# Patient Record
Sex: MALE | Race: BLACK OR AFRICAN AMERICAN | Employment: FULL TIME | URBAN - METROPOLITAN AREA
[De-identification: names, ages, dates, MRNs, and addresses within clinical notes are randomized per-mention and may not be internally consistent; named-entity substitution may affect disease eponyms.]

---

## 2017-02-21 ENCOUNTER — OFFICE VISIT (OUTPATIENT)
Dept: INTERNAL MEDICINE CLINIC | Age: 52
End: 2017-02-21

## 2017-02-21 VITALS
OXYGEN SATURATION: 98 % | HEIGHT: 67 IN | RESPIRATION RATE: 17 BRPM | HEART RATE: 61 BPM | DIASTOLIC BLOOD PRESSURE: 84 MMHG | SYSTOLIC BLOOD PRESSURE: 128 MMHG | WEIGHT: 234.4 LBS | BODY MASS INDEX: 36.79 KG/M2 | TEMPERATURE: 97.9 F

## 2017-02-21 DIAGNOSIS — Z00.00 PREVENTATIVE HEALTH CARE: Primary | ICD-10-CM

## 2017-02-21 DIAGNOSIS — G89.29 CHRONIC PAIN OF BOTH KNEES: ICD-10-CM

## 2017-02-21 DIAGNOSIS — M25.561 CHRONIC PAIN OF BOTH KNEES: ICD-10-CM

## 2017-02-21 DIAGNOSIS — M25.562 CHRONIC PAIN OF BOTH KNEES: ICD-10-CM

## 2017-02-21 DIAGNOSIS — N52.9 ERECTILE DYSFUNCTION, UNSPECIFIED ERECTILE DYSFUNCTION TYPE: ICD-10-CM

## 2017-02-21 RX ORDER — TADALAFIL 20 MG/1
20 TABLET ORAL AS NEEDED
Qty: 10 TAB | Refills: 11 | Status: SHIPPED | OUTPATIENT
Start: 2017-02-21 | End: 2017-10-25

## 2017-02-21 NOTE — MR AVS SNAPSHOT
Visit Information Date & Time Provider Department Dept. Phone Encounter #  
 2/21/2017 11:00 AM Pierre Jimenezevgeny [facility]an[facility]  Sports Medicine and Primary Care [phone] [id number] Follow-up Instructions Return in about 1 year (around 2/21/2018). Follow-up and Disposition History Upcoming Health Maintenance Date Due Pneumococcal 19-64 Medium Risk (1 of 1 - PPSV23) 9/13/1986 DTaP/Tdap/Td series (1 - Tdap) 9/13/1988 INFLUENZA AGE 9 TO ADULT 8/1/2016 Allergies as of 2/21/2017  Review Complete On: 2/21/2017 By: Yanira Sales MD  
 No Known Allergies Current Immunizations  Never Reviewed No immunizations on file. Not reviewed this visit You Were Diagnosed With   
  
 Codes Comments Preventative health care    -  Primary ICD-10-CM: Z00.00 ICD-9-CM: V70.0 Erectile dysfunction, unspecified erectile dysfunction type     ICD-10-CM: N52.9 ICD-9-CM: 607.84 Chronic pain of both knees     ICD-10-CM: M25.561, M25.562, G89.29 ICD-9-CM: 719.46, 338.29 Vitals BP Pulse Temp Resp Height(growth percentile) Weight(growth percentile) 128/84 (BP 1 Location: Left arm, BP Patient Position: Sitting) 61 97.9 °F (36.6 °C) (Oral) 17 5' 7\" (1.702 m) 234 lb 6.4 oz (106.3 kg) SpO2 BMI Smoking Status 98% 36.71 kg/m2 Current Every Day Smoker Vitals History BMI and BSA Data Body Mass Index Body Surface Area  
 36.71 kg/m 2 2.24 m 2 Preferred Pharmacy Pharmacy Name Phone [facility]/PHARMACY #[id number]- [place], [place] - [address] [phone] Your Updated Medication List  
  
   
This list is accurate as of: 2/21/17  1:07 PM.  Always use your most recent med list.  
  
  
  
  
 Diphth, Pertus(Acell), Tetanus 2.5-8-5 Lf-mcg-Lf/0.5mL Syrg Commonly known as:  BOOSTRIX TDAP  
0.5 mL by IntraMUSCular route once for 1 dose. pneumococcal 13 subhash conj dip 0.5 mL Syrg injection Commonly known as:  PREVNAR-13  
0.5 mL by IntraMUSCular route once for 1 dose. tadalafil 20 mg tablet Commonly known as:  CIALIS Take 1 Tab by mouth as needed. Prescriptions Sent to Pharmacy Refills  
 pneumococcal 13 subhash conj dip (PREVNAR-13) 0.5 mL syrg injection 0 Si.5 mL by IntraMUSCular route once for 1 dose. Class: Normal  
 Pharmacy: [facility]/pharmacy [address] [place] [address] [address] Ph #: [phone] Route: IntraMUSCular Jordis Barks,, Tetanus (BOOSTRIX TDAP) 2.5-8-5 Lf-mcg-Lf/0.5mL syrg 0 Si.5 mL by IntraMUSCular route once for 1 dose. Class: Normal  
 Pharmacy: [facility]/pharmacy [address] [place] [address] [address] Ph #: [phone] Route: IntraMUSCular  
 tadalafil (CIALIS) 20 mg tablet 11 Sig: Take 1 Tab by mouth as needed. Class: Normal  
 Pharmacy: [facility]/pharmacy [address] [place] [address] [address] Ph #: [phone] Route: Oral  
  
We Performed the Following CBC WITH AUTOMATED DIFF [40754 CPT(R)] HEMOGLOBIN A1C WITH EAG [41994 CPT(R)] LIPID PANEL [51042 CPT(R)] METABOLIC PANEL, COMPREHENSIVE [26543 CPT(R)] IN COLLECTION VENOUS BLOOD,VENIPUNCTURE [id number] CPT(R)] PROSTATE SPECIFIC AG (PSA) [id number] CPT(R)] URINALYSIS W/ RFLX MICROSCOPIC [82892 CPT(R)] Follow-up Instructions Return in about 1 year (around 2018). Introducing [facility] & HE[facility]! Jairo Salinas introduces [employer] patient portal. Now you can access parts of your medical record, email your doctor's office, and request medication refills online. 1. In your internet browser, go to [email or web address] 2. Click on the First Time User? Click Here link in the Sign In box. You will see the New Member Sign Up page. 3. Enter your Kaeuferportal Access Code exactly as it appears below. You will not need to use this code after youve completed the sign-up process. If you do not sign up before the expiration date, you must request a new code. · Kaeuferportal Access Code: [email or web address] Expires: 5/22/2017  1:07 PM 
 
4. Enter the last four digits of your Social Security Number (xxxx) and Date of Birth (mm/dd/yyyy) as indicated and click Submit. You will be taken to the next sign-up page. 5. Create a Kaeuferportal ID. This will be your Kaeuferportal login ID and cannot be changed, so think of one that is secure and easy to remember. 6. Create a Kaeuferportal password. You can change your password at any time. 7. Enter your Password Reset Question and Answer. This can be used at a later time if you forget your password. 8. Enter your e-mail address. You will receive e-mail notification when new information is available in [address]. 9. Click Sign Up. You can now view and download portions of your medical record. 10. Click the Download Summary menu link to download a portable copy of your medical information. If you have questions, please visit the Frequently Asked Questions section of the Kaeuferportal website. Remember, Kaeuferportal is NOT to be used for urgent needs. For medical emergencies, dial 911. Now available from your iPhone and Android! Please provide this summary of care documentation to your next provider. Your primary care clinician is listed as Alfonzo Barry. If you have any questions after today's visit, please call [phone].

## 2017-02-21 NOTE — PROGRESS NOTES
SPORTS MEDICINE AND PRIMARY CARE  Rae Limon MD, Nicholas Marino[address] [address]  Phone:  [phone]  Fax: [phone]      Chief Complaint   Patient presents with    Physical     complete physical          SUBECTIVE:    Rich Pleitez is a 54 y.o. male Patient returns today alert and appropriate and ambulatory and has the capacity to give an accurate history. Patient comes in for a wellness exam.  He is also concerned about his knee, particularly on the left. He has had problems with ED and has used Cialis in the past and wonders if he can have a refill. Other new complaints are denied. Patient is seen for evaluation. Past Medical History   Diagnosis Date    Erectile dysfunction     Knee pain, bilateral     Obesity     Preventative health care      History reviewed. No pertinent past surgical history. No Known Allergies    REVIEW OF SYSTEMS:   No chest pain. No shortness of breath. Social History     Social History    Marital status:      Spouse name: N/A    Number of children: N/A    Years of education: N/A     Social History Main Topics    Smoking status: Current Every Day Smoker    Smokeless tobacco: None    Alcohol use Yes    Drug use: None    Sexual activity: Not Asked     Other Topics Concern    None     Social History Narrative    Family History: Mother:  Father:  Daughter(s): alive 15 yrs Son(s): alive 7,20 yrs 3 brother(s) , 5 sister(s) . 2 son(s) , 1    daughter(s) . Adopted patient. Social History: Alcohol Use Patient uses alcohol, Drinks per occasion: 2, Drinks per w [place]: 0. Smoking Status Patient is a current every day    smoker, Received cessation counseling on: 2013. Marital Status: . Lives w ith: spouse. Occupation/W ork: employed full time    w [employer]. Education/School: has highschool diploma, college. [employer]ism: [employer]. r  History reviewed.  No pertinent family history. OBJECTIVE:  Visit Vitals    /84 (BP 1 Location: Left arm, BP Patient Position: Sitting)    Pulse 61    Temp 97.9 °F (36.6 °C) (Oral)    Resp 17    Ht 5' 7\" (1.702 m)    Wt 234 lb 6.4 oz (106.3 kg)    SpO2 98%    BMI 36.71 kg/m2     ENT: perrla,  eom intact  NECK: supple. Thyroid normal  CHEST: clear to ascultation and percussion   HEART: regular rate and rhythm  ABD: soft, bowel sounds active  EXTREMITIES: no edema, pulse 1+     No visits with results within 3 Month(s) from this visit. Latest known visit with results is:    Office Visit on 10/07/2015   Component Date Value Ref Range Status    Specific Gravity 10/07/2015 1.024  1.005 - 1.030 Final    pH (UA) 10/07/2015 7.0  5.0 - 7.5 Final    Color 10/07/2015 Yellow  Yellow Final    Appearance 10/07/2015 Clear  Clear Final    Leukocyte Esterase 10/07/2015 1+* Negative Final    Protein 10/07/2015 Negative  Negative/Trace Final    Glucose 10/07/2015 Negative  Negative Final    Ketone 10/07/2015 Negative  Negative Final    Blood 10/07/2015 Negative  Negative Final    Bilirubin 10/07/2015 Negative  Negative Final    Urobilinogen 10/07/2015 2.0* 0.0 - 1.9 mg/dL Final    Nitrites 10/07/2015 Negative  Negative Final    Microscopic Examination 10/07/2015 See additional order   Final    Microscopic was indicated and was performed.     WBC 10/07/2015 4.2  3.4 - 10.8 x10E3/uL Final    RBC 10/07/2015 4.34  4.14 - 5.80 x10E6/uL Final    HGB 10/07/2015 13.3  12.6 - 17.7 g/dL Final    HCT 10/07/2015 41.7  37.5 - 51.0 % Final    MCV 10/07/2015 96  79 - 97 fL Final    MCH 10/07/2015 30.6  26.6 - 33.0 pg Final    MCHC 10/07/2015 31.9  31.5 - 35.7 g/dL Final    RDW 10/07/2015 12.3  12.3 - 15.4 % Final    PLATELET 04/24/5870 977  150 - 379 x10E3/uL Final    NEUTROPHILS 10/07/2015 50  % Final    Lymphocytes 10/07/2015 40  % Final    MONOCYTES 10/07/2015 7  % Final    EOSINOPHILS 10/07/2015 2  % Final    BASOPHILS 10/07/2015 1  % Final  ABS. NEUTROPHILS 10/07/2015 2.1  1.4 - 7.0 x10E3/uL Final    Abs Lymphocytes 10/07/2015 1.7  0.7 - 3.1 x10E3/uL Final    ABS. MONOCYTES 10/07/2015 0.3  0.1 - 0.9 x10E3/uL Final    ABS. EOSINOPHILS 10/07/2015 0.1  0.0 - 0.4 x10E3/uL Final    ABS. BASOPHILS 10/07/2015 0.0  0.0 - 0.2 x10E3/uL Final    IMMATURE GRANULOCYTES 10/07/2015 0  % Final    ABS. IMM. GRANS. 10/07/2015 0.0  0.0 - 0.1 x10E3/uL Final    Glucose 10/07/2015 73  65 - 99 mg/dL Final    BUN 10/07/2015 15  6 - 24 mg/dL Final    Creatinine 10/07/2015 1.03  0.76 - 1.27 mg/dL Final    GFR est non-AA 10/07/2015 85  >59 mL/min/1.73 Final    GFR est AA 10/07/2015 99  >59 mL/min/1.73 Final    BUN/Creatinine ratio 10/07/2015 15  9 - 20 Final    Sodium 10/07/2015 141  134 - 144 mmol/L Final    Potassium 10/07/2015 5.0  3.5 - 5.2 mmol/L Final    Chloride 10/07/2015 103  97 - 108 mmol/L Final    CO2 10/07/2015 25  18 - 29 mmol/L Final    Calcium 10/07/2015 9.2  8.7 - 10.2 mg/dL Final    Protein, total 10/07/2015 7.0  6.0 - 8.5 g/dL Final    Albumin 10/07/2015 4.2  3.5 - 5.5 g/dL Final    GLOBULIN, TOTAL 10/07/2015 2.8  1.5 - 4.5 g/dL Final    A-G Ratio 10/07/2015 1.5  1.1 - 2.5 Final    Bilirubin, total 10/07/2015 0.9  0.0 - 1.2 mg/dL Final    Alk. phosphatase 10/07/2015 75  39 - 117 IU/L Final    AST (SGOT) 10/07/2015 34  0 - 40 IU/L Final    ALT (SGPT) 10/07/2015 27  0 - 44 IU/L Final    Cholesterol, total 10/07/2015 156  100 - 199 mg/dL Final    Triglyceride 10/07/2015 67  0 - 149 mg/dL Final    HDL Cholesterol 10/07/2015 52  >39 mg/dL Final    Comment: According to ATP-III Guidelines, HDL-C >59 mg/dL is considered a  negative risk factor for CHD.  VLDL, calculated 10/07/2015 13  5 - 40 mg/dL Final    LDL, calculated 10/07/2015 91  0 - 99 mg/dL Final    Prostate Specific Ag 10/07/2015 0.2  0.0 - 4.0 ng/mL Final    Comment: Roche ECLIA methodology.   According to the American Urological Association, Serum PSA should  decrease and remain at undetectable levels after radical  prostatectomy. The AUA defines biochemical recurrence as an initial  PSA value 0.2 ng/mL or greater followed by a subsequent confirmatory  PSA value 0.2 ng/mL or greater. Values obtained with different assay methods or kits cannot be used  interchangeably. Results cannot be interpreted as absolute evidence  of the presence or absence of malignant disease.  Hemoglobin A1c 10/07/2015 4.9  4.8 - 5.6 % Final    Comment:          Increased risk for diabetes: 5.7 - 6.4           Diabetes: >6.4           Glycemic control for adults with diabetes: <7.0      WBC 10/07/2015 >30* 0 - 5 /hpf Final    RBC 10/07/2015 0-2  0 - 2 /hpf Final    Epithelial cells 10/07/2015 0-10  0 - 10 /hpf Final    Mucus 10/07/2015 Present  Not Estab. Final    Bacteria 10/07/2015 Few  None seen/Few Final          ASSESSMENT:  1. Preventative health care    2. Erectile dysfunction, unspecified erectile dysfunction type    3. Chronic pain of both knees      Patient's medical status is generally stable. Repeat blood pressure is 124/82 which is completely acceptable. His BMI is 36.71 indicating obesity. I think this is a contributing factor for his discomfort in his knees. I think if he got down towards his ideal body weight the knee discomfort would decrease if not resolve. To that end then we suggest thirty minutes of physical activity five days a week and a heart healthy weight reducing diet. Appropriate laboratory studies will be requested and will be sent to her in the mail. He will return to us in one year.           PLAN:  .  Orders Placed This Encounter    URINALYSIS W/ RFLX MICROSCOPIC    CBC WITH AUTOMATED DIFF    METABOLIC PANEL, COMPREHENSIVE    LIPID PANEL    PROSTATE SPECIFIC AG    HEMOGLOBIN A1C WITH EAG    pneumococcal 13 subhash conj dip (PREVNAR-13) 0.5 mL syrg injection    Diphth, Pertus,Acell,, Tetanus (BOOSTRIX TDAP) 2.5-8-5 Lf-mcg-Lf/0.5mL syrg    tadalafil (CIALIS) 20 mg tablet       Follow-up Disposition:  Return in about 1 year (around 2/21/2018). ATTENTION:   This medical record was transcribed using an electronic medical records system. Although proofread, it may and can contain electronic and spelling errors. Other human spelling and other errors may be present. Corrections may be executed at a later time. Please feel free to contact us for any clarifications as needed.

## 2017-02-21 NOTE — PROGRESS NOTES
1. Have you been to the ER, urgent care clinic since your last visit? Hospitalized since your last visit? No    2. Have you seen or consulted any other health care providers outside of the [address] [place] [address] since your last visit? Include any pap smears or colon screening.  No

## 2017-02-22 LAB
ALBUMIN SERPL-MCNC: 4.3 G/DL (ref 3.5–5.5)
ALBUMIN/GLOB SERPL: 1.4 {RATIO} (ref 1.1–2.5)
ALP SERPL-CCNC: 78 IU/L (ref 39–117)
ALT SERPL-CCNC: 18 IU/L (ref 0–44)
APPEARANCE UR: CLEAR
AST SERPL-CCNC: 26 IU/L (ref 0–40)
BACTERIA #/AREA URNS HPF: NORMAL /[HPF]
BASOPHILS # BLD AUTO: 0 X10E3/UL (ref 0–0.2)
BASOPHILS NFR BLD AUTO: 0 %
BILIRUB SERPL-MCNC: 1.7 MG/DL (ref 0–1.2)
BILIRUB UR QL STRIP: NEGATIVE
BUN SERPL-MCNC: 10 MG/DL (ref 6–24)
BUN/CREAT SERPL: 10 (ref 9–20)
CALCIUM SERPL-MCNC: 9 MG/DL (ref 8.7–10.2)
CASTS URNS QL MICRO: NORMAL /LPF
CHLORIDE SERPL-SCNC: 99 MMOL/L (ref 96–106)
CHOLEST SERPL-MCNC: 152 MG/DL (ref 100–199)
CO2 SERPL-SCNC: 28 MMOL/L (ref 18–29)
COLOR UR: YELLOW
CREAT SERPL-MCNC: 1 MG/DL (ref 0.76–1.27)
EOSINOPHIL # BLD AUTO: 0 X10E3/UL (ref 0–0.4)
EOSINOPHIL NFR BLD AUTO: 1 %
EPI CELLS #/AREA URNS HPF: NORMAL /HPF
ERYTHROCYTE [DISTWIDTH] IN BLOOD BY AUTOMATED COUNT: 12.4 % (ref 12.3–15.4)
EST. AVERAGE GLUCOSE BLD GHB EST-MCNC: 94 MG/DL
GLOBULIN SER CALC-MCNC: 3.1 G/DL (ref 1.5–4.5)
GLUCOSE SERPL-MCNC: 81 MG/DL (ref 65–99)
GLUCOSE UR QL: NEGATIVE
HBA1C MFR BLD: 4.9 % (ref 4.8–5.6)
HCT VFR BLD AUTO: 40.6 % (ref 37.5–51)
HDLC SERPL-MCNC: 55 MG/DL
HGB BLD-MCNC: 12.9 G/DL (ref 12.6–17.7)
HGB UR QL STRIP: NEGATIVE
IMM GRANULOCYTES # BLD: 0 X10E3/UL (ref 0–0.1)
IMM GRANULOCYTES NFR BLD: 0 %
KETONES UR QL STRIP: NEGATIVE
LDLC SERPL CALC-MCNC: 86 MG/DL (ref 0–99)
LEUKOCYTE ESTERASE UR QL STRIP: ABNORMAL
LYMPHOCYTES # BLD AUTO: 1.7 X10E3/UL (ref 0.7–3.1)
LYMPHOCYTES NFR BLD AUTO: 42 %
MCH RBC QN AUTO: 30.3 PG (ref 26.6–33)
MCHC RBC AUTO-ENTMCNC: 31.8 G/DL (ref 31.5–35.7)
MCV RBC AUTO: 95 FL (ref 79–97)
MICRO URNS: ABNORMAL
MONOCYTES # BLD AUTO: 0.3 X10E3/UL (ref 0.1–0.9)
MONOCYTES NFR BLD AUTO: 7 %
NEUTROPHILS # BLD AUTO: 2 X10E3/UL (ref 1.4–7)
NEUTROPHILS NFR BLD AUTO: 50 %
NITRITE UR QL STRIP: NEGATIVE
PH UR STRIP: 7.5 [PH] (ref 5–7.5)
PLATELET # BLD AUTO: 192 X10E3/UL (ref 150–379)
POTASSIUM SERPL-SCNC: 4.9 MMOL/L (ref 3.5–5.2)
PROT SERPL-MCNC: 7.4 G/DL (ref 6–8.5)
PROT UR QL STRIP: NEGATIVE
PSA SERPL-MCNC: 0.5 NG/ML (ref 0–4)
RBC # BLD AUTO: 4.26 X10E6/UL (ref 4.14–5.8)
RBC #/AREA URNS HPF: NORMAL /HPF
SODIUM SERPL-SCNC: 137 MMOL/L (ref 134–144)
SP GR UR: 1.01 (ref 1–1.03)
TRIGL SERPL-MCNC: 57 MG/DL (ref 0–149)
UROBILINOGEN UR STRIP-MCNC: 1 MG/DL (ref 0.2–1)
VLDLC SERPL CALC-MCNC: 11 MG/DL (ref 5–40)
WBC # BLD AUTO: 4 X10E3/UL (ref 3.4–10.8)
WBC #/AREA URNS HPF: NORMAL /HPF

## 2017-10-25 ENCOUNTER — OFFICE VISIT (OUTPATIENT)
Dept: INTERNAL MEDICINE CLINIC | Age: 52
End: 2017-10-25

## 2017-10-25 VITALS
SYSTOLIC BLOOD PRESSURE: 139 MMHG | TEMPERATURE: 98.3 F | WEIGHT: 247.9 LBS | BODY MASS INDEX: 38.91 KG/M2 | HEART RATE: 72 BPM | DIASTOLIC BLOOD PRESSURE: 87 MMHG | OXYGEN SATURATION: 90 % | RESPIRATION RATE: 16 BRPM | HEIGHT: 67 IN

## 2017-10-25 DIAGNOSIS — M25.562 CHRONIC PAIN OF BOTH KNEES: Primary | ICD-10-CM

## 2017-10-25 DIAGNOSIS — M25.561 CHRONIC PAIN OF BOTH KNEES: Primary | ICD-10-CM

## 2017-10-25 DIAGNOSIS — E66.9 OBESITY (BMI 35.0-39.9 WITHOUT COMORBIDITY): ICD-10-CM

## 2017-10-25 DIAGNOSIS — N52.9 ERECTILE DYSFUNCTION, UNSPECIFIED ERECTILE DYSFUNCTION TYPE: ICD-10-CM

## 2017-10-25 DIAGNOSIS — G89.29 CHRONIC PAIN OF BOTH KNEES: Primary | ICD-10-CM

## 2017-10-25 RX ORDER — SILDENAFIL 100 MG/1
100 TABLET, FILM COATED ORAL AS NEEDED
Qty: 10 TAB | Refills: 11 | Status: SHIPPED | OUTPATIENT
Start: 2017-10-25

## 2017-10-25 NOTE — MR AVS SNAPSHOT
Visit Information Date & Time Provider Department Dept. Phone Encounter #  
 10/25/2017 12:45 PM Rich Greer MD [address] [place] [address] and Primary Care [phone] [id number] Follow-up Instructions Return in about 6 months (around 4/25/2018). Follow-up and Disposition History Your Appointments 4/25/2018 12:00 PM  
Any with Rich Greer MD  
[facility] and Primary Care [address] [place] [address]) Appt Note: 6 month f/u  
 [address] [facility]  
  
   
 [id number] Upcoming Health Maintenance Date Due FOBT Q 1 YEAR AGE 50-75 4/25/2018* DTaP/Tdap/Td series (2 - Td) 10/25/2027 *Topic was postponed. The date shown is not the original due date. Allergies as of 10/25/2017  Review Complete On: 10/25/2017 By: Rich Greer MD  
 No Known Allergies Current Immunizations  Never Reviewed No immunizations on file. Not reviewed this visit You Were Diagnosed With   
  
 Codes Comments Chronic pain of both knees    -  Primary ICD-10-CM: M25.561, M25.562, G89.29 ICD-9-CM: 719.46, 338.29 Erectile dysfunction, unspecified erectile dysfunction type     ICD-10-CM: N52.9 ICD-9-CM: 607.84 Obesity (BMI 35.0-39.9 without comorbidity)     ICD-10-CM: C82.0 ICD-9-CM: 278.00 Vitals BP Pulse Temp Resp Height(growth percentile) Weight(growth percentile) 139/87 (BP 1 Location: Left arm, BP Patient Position: Sitting) 72 98.3 °F (36.8 °C) (Oral) 16 5' 7\" (1.702 m) 247 lb 14.4 oz (112.4 kg) SpO2 BMI Smoking Status 90% 38.83 kg/m2 Current Every Day Smoker BMI and BSA Data Body Mass Index Body Surface Area  
 38.83 kg/m 2 2.31 m 2 Preferred Pharmacy Pharmacy Name Phone [facility]/PHARMACY #[id number]- [place], [place] - [address] [phone] Your Updated Medication List  
 This list is accurate as of: 10/25/17  2:33 PM.  Always use your most recent med list.  
  
  
  
  
 sildenafil citrate 100 mg tablet Commonly known as:  VIAGRA Take 1 Tab by mouth as needed. Prescriptions Printed Refills  
 sildenafil citrate (VIAGRA) 100 mg tablet 11 Sig: Take 1 Tab by mouth as needed. Class: Print Route: Oral  
  
We Performed the Following REFERRAL TO ORTHOPEDICS [GYE240 Custom] Follow-up Instructions Return in about 6 months (around 4/25/2018). Referral Information Referral ID Referred By Referred To [address] [place] [address] [facility]   
   [address] [facility], [address] Visits Status Start Date End Date 1 New Request 10/25/17 10/25/18 If your referral has a status of pending review or denied, additional information will be sent to support the outcome of this decision. Introducing [facility]! [employer] introduces [email or web address] patient portal. Now you can access parts of your medical record, email your doctor's office, and request medication refills online. 1. In your internet browser, go to [email or web address] "[email or web address][employer] [email or web address] 2. Click on the First Time User? Click Here link in the Sign In box. You will see the New Member Sign Up page. 3. Enter your [email or web address] Access Code exactly as it appears below. You will not need to use this code after youve completed the sign-up process. If you do not sign up before the expiration date, you must request a new code. · [email or web address] Access Code: [email or web address] Expires: 1/23/2018  2:33 PM 
 
4. Enter the last four digits of your Social Security Number (xxxx) and Date of Birth (mm/dd/yyyy) as indicated and click Submit. You will be taken to the next sign-up page. 5. Create a [email or web address] ID. This will be your [email or web address] login ID and cannot be changed, so think of one that is secure and easy to remember. 6. Create a Omnireliant password. You can change your password at any time. 7. Enter your Password Reset Question and Answer. This can be used at a later time if you forget your password. 8. Enter your e-mail address. You will receive e-mail notification when new information is available in [address] 9. Click Sign Up. You can now view and download portions of your medical record. 10. Click the Download Summary menu link to download a portable copy of your medical information. If you have questions, please visit the Frequently Asked Questions section of the Omn[employer]liant website. Remember, Omn[employer]liant is NOT to be used for urgent needs. For medical emergencies, dial 911. Now available from your iPhone and Android! Please provide this summary of care documentation to your next provider. Your primary care clinician is listed as Viviana Yang. If you have any questions after today's visit, please call [phone].

## 2017-10-25 NOTE — PROGRESS NOTES
Chief Complaint   Patient presents with    Mass     bump on the left thumb x2 weeks    Knee Pain     knee pain is intermittent and has been going on since high school     1. Have you been to the ER, urgent care clinic since your last visit? Hospitalized since your last visit? No    2. Have you seen or consulted any other health care providers outside of the [address] [place] [address] since your last visit? Include any pap smears or colon screening.  No

## 2017-10-25 NOTE — PROGRESS NOTES
SPORTS MEDICINE AND PRIMARY CARE  Sapna Lara MD, [address], [place]  [address]  Phone:  [phone]  Fax: [phone]      Chief Complaint   Patient presents with    Mass     bump on the left thumb x2 weeks    Knee Pain     knee pain is intermittent and has been going on since high school         SUBECTIVE:    Kareen Salas is a 50 y.o. male Patient returns today alert, appropriate, ambulatory and has the capacity to give an accurate history. Patient has a known history of bilateral knee pain, obesity and ED. Patient complains of a little bump at the base on his thumb on the left at the palmar aspect. He grabbed his gun at work and noticed a little discomfort there and is seen for evaluation. Patient complains of discomfort in his knees, but particularly on the left, where he has limitation of motion of the knee. He wonders if it was an old injury years ago, but would like it checked out. Patient is seen for evaluation. Current Outpatient Prescriptions   Medication Sig Dispense Refill    sildenafil citrate (VIAGRA) 100 mg tablet Take 1 Tab by mouth as needed. 10 Tab 11     Past Medical History:   Diagnosis Date    Erectile dysfunction     Knee pain, bilateral     Obesity     Obesity (BMI 35.0-39.9 without comorbidity)     Preventative health care      History reviewed. No pertinent surgical history. No Known Allergies    REVIEW OF SYSTEMS:   No chest pain, no shortness of breath. Social History     Social History    Marital status:      Spouse name: N/A    Number of children: N/A    Years of education: N/A     Social History Main Topics    Smoking status: Current Every Day Smoker    Smokeless tobacco: Never Used    Alcohol use Yes    Drug use: No    Sexual activity: Yes     Partners: Female     Other Topics Concern    None     Social History Narrative    Family History:  Mother:  Father:  Daughter(s): alive 15 yrs Son(s): alive 7,20 yrs 3 brother(s) , 5 sister(s) . 2 son(s) , 1    daughter(s) . Adopted patient. Social History: Alcohol Use Patient uses alcohol, Drinks per occasion: 2, Drinks per w [place]: 0. Smoking Status Patient is a current every day    smoker, Received cessation counseling on: 02/12/2013. Marital Status: . Lives w ith: spouse. Occupation/W ork: employed full time    w [employer]. Education/School: has highschool diploma, college. [employer]ology: [email or web address]. r  History reviewed. No pertinent family history. OBJECTIVE:  Visit Vitals    /87 (BP 1 Location: Left arm, BP Patient Position: Sitting)    Pulse 72    Temp 98.3 °F (36.8 °C) (Oral)    Resp 16    Ht 5' 7\" (1.702 m)    Wt 247 lb 14.4 oz (112.4 kg)    SpO2 90%    BMI 38.83 kg/m2     ENT: perrla,  eom intact  NECK: supple. Thyroid normal  CHEST: clear to ascultation and percussion   HEART: regular rate and rhythm  ABD: soft, bowel sounds active  EXTREMITIES: no edema, pulse 1+     No visits with results within 3 Month(s) from this visit. Latest known visit with results is:    Office Visit on 02/21/2017   Component Date Value Ref Range Status    Specific Gravity 02/21/2017 1.014  1.005 - 1.030 Final    pH (UA) 02/21/2017 7.5  5.0 - 7.5 Final    Color 02/21/2017 Yellow  Yellow Final    Appearance 02/21/2017 Clear  Clear Final    Leukocyte Esterase 02/21/2017 Trace* Negative Final    Protein 02/21/2017 Negative  Negative/Trace Final    Glucose 02/21/2017 Negative  Negative Final    Ketone 02/21/2017 Negative  Negative Final    Blood 02/21/2017 Negative  Negative Final    Bilirubin 02/21/2017 Negative  Negative Final    Urobilinogen 02/21/2017 1.0  0.2 - 1.0 mg/dL Final    Nitrites 02/21/2017 Negative  Negative Final    Microscopic Examination 02/21/2017 See additional order   Final    Microscopic was indicated and was performed.     WBC 02/21/2017 4.0  3.4 - 10.8 x10E3/uL Final    RBC 02/21/2017 4.26  4.14 - 5.80 x10E6/uL Final    HGB 02/21/2017 12.9  12.6 - 17.7 g/dL Final    HCT 02/21/2017 40.6  37.5 - 51.0 % Final    MCV 02/21/2017 95  79 - 97 fL Final    MCH 02/21/2017 30.3  26.6 - 33.0 pg Final    MCHC 02/21/2017 31.8  31.5 - 35.7 g/dL Final    RDW 02/21/2017 12.4  12.3 - 15.4 % Final    PLATELET 68/35/9940 841  150 - 379 x10E3/uL Final    NEUTROPHILS 02/21/2017 50  % Final    Lymphocytes 02/21/2017 42  % Final    MONOCYTES 02/21/2017 7  % Final    EOSINOPHILS 02/21/2017 1  % Final    BASOPHILS 02/21/2017 0  % Final    ABS. NEUTROPHILS 02/21/2017 2.0  1.4 - 7.0 x10E3/uL Final    Abs Lymphocytes 02/21/2017 1.7  0.7 - 3.1 x10E3/uL Final    ABS. MONOCYTES 02/21/2017 0.3  0.1 - 0.9 x10E3/uL Final    ABS. EOSINOPHILS 02/21/2017 0.0  0.0 - 0.4 x10E3/uL Final    ABS. BASOPHILS 02/21/2017 0.0  0.0 - 0.2 x10E3/uL Final    IMMATURE GRANULOCYTES 02/21/2017 0  % Final    ABS. IMM. GRANS. 02/21/2017 0.0  0.0 - 0.1 x10E3/uL Final    Glucose 02/21/2017 81  65 - 99 mg/dL Final    BUN 02/21/2017 10  6 - 24 mg/dL Final    Creatinine 02/21/2017 1.00  0.76 - 1.27 mg/dL Final    GFR est non-AA 02/21/2017 88  >59 mL/min/1.73 Final    GFR est AA 02/21/2017 102  >59 mL/min/1.73 Final    BUN/Creatinine ratio 02/21/2017 10  9 - 20 Final    Sodium 02/21/2017 137  134 - 144 mmol/L Final    Potassium 02/21/2017 4.9  3.5 - 5.2 mmol/L Final    Chloride 02/21/2017 99  96 - 106 mmol/L Final    CO2 02/21/2017 28  18 - 29 mmol/L Final    Calcium 02/21/2017 9.0  8.7 - 10.2 mg/dL Final    Protein, total 02/21/2017 7.4  6.0 - 8.5 g/dL Final    Albumin 02/21/2017 4.3  3.5 - 5.5 g/dL Final    GLOBULIN, TOTAL 02/21/2017 3.1  1.5 - 4.5 g/dL Final    A-G Ratio 02/21/2017 1.4  1.1 - 2.5 Final    Comment: **Effective March 13, 2017 the reference interval**    for A/G Ratio will be changing to:                Age                Male          Female            0 -  7 days       1.1 - 2.3       1.1 - 2.3            8 - 30 days 1.2 - 2.8       1.2 - 2.8            1 -  6 months     1.3 - 3.6       1.3 - 3.6     7 months -  5 years      1.5 - 2.6       1.5 - 2.6               > 5 years      1.2 - 2.2       1.2 - 2.2      Bilirubin, total 02/21/2017 1.7* 0.0 - 1.2 mg/dL Final    Alk. phosphatase 02/21/2017 78  39 - 117 IU/L Final    AST (SGOT) 02/21/2017 26  0 - 40 IU/L Final    ALT (SGPT) 02/21/2017 18  0 - 44 IU/L Final    Cholesterol, total 02/21/2017 152  100 - 199 mg/dL Final    Triglyceride 02/21/2017 57  0 - 149 mg/dL Final    HDL Cholesterol 02/21/2017 55  >39 mg/dL Final    VLDL, calculated 02/21/2017 11  5 - 40 mg/dL Final    LDL, calculated 02/21/2017 86  0 - 99 mg/dL Final    Prostate Specific Ag 02/21/2017 0.5  0.0 - 4.0 ng/mL Final    Comment: Roche ECLIA methodology. According to the American Urological Association, Serum PSA should  decrease and remain at undetectable levels after radical  prostatectomy. The AUA defines biochemical recurrence as an initial  PSA value 0.2 ng/mL or greater followed by a subsequent confirmatory  PSA value 0.2 ng/mL or greater. Values obtained with different assay methods or kits cannot be used  interchangeably. Results cannot be interpreted as absolute evidence  of the presence or absence of malignant disease.  Hemoglobin A1c 02/21/2017 4.9  4.8 - 5.6 % Final    Comment:          Pre-diabetes: 5.7 - 6.4           Diabetes: >6.4           Glycemic control for adults with diabetes: <7.0      Estimated average glucose 02/21/2017 94  mg/dL Final    WBC 02/21/2017 0-5  0 - 5 /hpf Final    RBC 02/21/2017 0-2  0 - 2 /hpf Final    Epithelial cells 02/21/2017 0-10  0 - 10 /hpf Final    Casts 02/21/2017 None seen  None seen /lpf Final    Bacteria 02/21/2017 None seen  None seen/Few Final          ASSESSMENT:  1. Chronic pain of both knees    2. Erectile dysfunction, unspecified erectile dysfunction type    3.  Obesity (BMI 35.0-39.9 without comorbidity)      Thumb exam reveals a tiny nodule on the flexor surface of the flexor hallucis tendon. It is mildly tender. My vote is to observe, but we're going to ask him to see an orthopedic doctor about his knee, where he has limitation of the knee, as well as crepitation of the knee and joint laxity, to help us make a decision about both. He'll then return to our office as needed. His blood pressure is in the upper limits of normal, but acceptable. He remains with a BMI that reflects obesity and we would certainly encourage a heart healthy, weight reducing diet. PLAN:  .  Orders Placed This Encounter    [facility]    sildenafil citrate (VIAGRA) 100 mg tablet       Follow-up Disposition:  Return in about 6 months (around 4/25/2018). ATTENTION:   This medical record was transcribed using an electronic medical records system. Although proofread, it may and can contain electronic and spelling errors. Other human spelling and other errors may be present. Corrections may be executed at a later time. Please feel free to contact us for any clarifications as needed.

## 2021-12-16 ENCOUNTER — OFFICE VISIT (OUTPATIENT)
Dept: INTERNAL MEDICINE CLINIC | Age: 56
End: 2021-12-16
Payer: COMMERCIAL

## 2021-12-16 VITALS
SYSTOLIC BLOOD PRESSURE: 128 MMHG | TEMPERATURE: 98.6 F | DIASTOLIC BLOOD PRESSURE: 85 MMHG | RESPIRATION RATE: 16 BRPM | OXYGEN SATURATION: 93 % | HEART RATE: 81 BPM | WEIGHT: 245 LBS | HEIGHT: 67 IN | BODY MASS INDEX: 38.45 KG/M2

## 2021-12-16 DIAGNOSIS — Z11.59 NEED FOR HEPATITIS C SCREENING TEST: ICD-10-CM

## 2021-12-16 DIAGNOSIS — M17.12 ARTHRITIS OF LEFT KNEE: ICD-10-CM

## 2021-12-16 DIAGNOSIS — Z00.00 VISIT FOR WELL MAN HEALTH CHECK: Primary | ICD-10-CM

## 2021-12-16 DIAGNOSIS — S46.012A TRAUMATIC INCOMPLETE TEAR OF LEFT ROTATOR CUFF, INITIAL ENCOUNTER: ICD-10-CM

## 2021-12-16 PROCEDURE — 99396 PREV VISIT EST AGE 40-64: CPT | Performed by: FAMILY MEDICINE

## 2021-12-16 PROCEDURE — 99214 OFFICE O/P EST MOD 30 MIN: CPT | Performed by: FAMILY MEDICINE

## 2021-12-16 RX ORDER — NAPROXEN 500 MG/1
500 TABLET ORAL 2 TIMES DAILY WITH MEALS
Qty: 30 TABLET | Refills: 2 | Status: SHIPPED | OUTPATIENT
Start: 2021-12-16

## 2021-12-16 NOTE — PROGRESS NOTES
Chief Complaint   Patient presents with    Complete Physical     Patient is here for a wellness visit      he is a 49y.o. year old male who presents for CPE. Complete Physical Exam Questions:    1. Do you follow a low fat diet?  no  2. Are you up to date on your Tdap (<10 years)? Unknown  3. Have you ever had a Pneumovax vaccine (>65)? No   PCV13 No   PPSV23 No  4. Have you had Zoster vaccine (>60)? No  5. Have you had the HPV - Gardasil (13- 26)? No  6. Do you follow an exercise program?  no  7. Do you smoke?  yes If > 65 and smoker, have you had a abdominal aortic aneurysm ultrasound screen? No  8. Do you consider yourself overweight?  no  9. Is there a family history of CAD< age 48? No  10. Is there a family history of Cancer? No  11. Do you know your Cancer risks? No  12. Have you had a colonoscopy? Yes  13. Have you been tested for HIV or other STI's? No HIV today(18-64 y/o)? Yes   14. Have you had an EKG in the last five years(>50)? No  15. Have you had a PSA test done this year (50-69)? Unknown    Other complaints: Left shoulder pain    Reviewed and agree with Nurse Note and duplicated in this note. Reviewed PmHx, RxHx, FmHx, SocHx, AllgHx and updated and dated in the chart. History reviewed. No pertinent family history. Past Medical History:   Diagnosis Date    Erectile dysfunction     Knee pain, bilateral     Obesity     Obesity (BMI 35.0-39.9 without comorbidity)     Preventative health care       Social History     Socioeconomic History    Marital status:    Tobacco Use    Smoking status: Current Every Day Smoker    Smokeless tobacco: Never Used   Substance and Sexual Activity    Alcohol use: Yes    Drug use: No    Sexual activity: Yes     Partners: Female   Social History Narrative    Family History: Mother:  Father:  Daughter(s): alive 15 yrs Son(s): alive 7,20 yrs 3 brother(s) , 5 sister(s) . 2 son(s) , 1    daughter(s) . Adopted patient. Social History: Alcohol Use Patient uses alcohol, Drinks per occasion: 2, Drinks per w [place]: 0. Smoking Status Patient is a current every day    smoker, Received cessation counseling on: 02/12/2013. Marital Status: . Lives w ith: spouse. Occupation/W ork: employed full time    w [employer]. Education/School: has highschool diploma, college. [employer]ism: [email or web address]. Review of Systems - negative except as listed above    Objective:     Vitals:    12/16/21 1327   BP: 128/85   Pulse: 81   Resp: 16   Temp: 98.6 °F (37 °C)   SpO2: 93%   Weight: 245 lb (111.1 kg)   Height: 5' 7\" (1.702 m)       Physical Examination: General appearance - alert, well appearing, and in no distress  Eyes - pupils equal and reactive, extraocular eye movements intact  Ears - bilateral TM's and external ear canals normal  Nose - normal and patent, no erythema, discharge or polyps  Mouth - mucous membranes moist, pharynx normal without lesions  Neck - supple, no significant adenopathy  Chest - clear to auscultation, no wheezes, rales or rhonchi, symmetric air entry  Heart - normal rate, regular rhythm, normal S1, S2, no murmurs, rubs, clicks or gallops  Abdomen - soft, nontender, nondistended, no masses or organomegaly  Skin - normal coloration and turgor, no rashes, no suspicious skin lesions noted       Assessment/ Plan:   Diagnoses and all orders for this visit:    1. Visit for [facility] health check  -     CBC W/O DIFF; Future  -     LIPID PANEL; Future  -     METABOLIC PANEL, COMPREHENSIVE; Future  -     PSA W/ REFLX FREE PSA; Future    2. Traumatic incomplete tear of left rotator cuff, initial encounter  -     XR SHOULDER LT AP/LAT MIN 2 V; Future    3. Arthritis of left knee  -     XR KNEE LT MIN 4 V; Future    4. Need for hepatitis C screening test  -     HEPATITIS C AB;  Future           Labs to be drawn: CBC, CMP, Lipid            I have discussed the diagnosis with the patient and the intended plan as seen in the above orders. The patient has received an after-visit summary and questions were answered concerning future plans. Medication Side Effects and Warnings were discussed with patient,  Patient Labs were reviewed and or requested, and  Patient Past Records were reviewed and or requested  yes         . Chief Complaint   Patient presents with    Complete Physical     Patient is here for a wellness visit      he is a 49y.o. year old male who presents for evaluation of left shoulder pain x1 month. Patient states that he spent basketball and got hit on his left shoulder while he is reaching up for a ball. Patient denies any dislocation of the time but states that his pain was 8 out of 10. This is since calmed down but now he has intermittent pain whenever he reaches above head. Patient also states that he has chronic left knee pain that is also intermittent in nature. Usually occurs when he runs or cuts. He has been seen by orthopedics diagnosed with arthritis. Reviewed and agree with Nurse Note and duplicated in this note. Reviewed PmHx, RxHx, FmHx, SocHx, AllgHx and updated and dated in the chart. History reviewed. No pertinent family history. Past Medical History:   Diagnosis Date    Erectile dysfunction     Knee pain, bilateral     Obesity     Obesity (BMI 35.0-39.9 without comorbidity)     Preventative health care       Social History     Socioeconomic History    Marital status:    Tobacco Use    Smoking status: Current Every Day Smoker    Smokeless tobacco: Never Used   Substance and Sexual Activity    Alcohol use: Yes    Drug use: No    Sexual activity: Yes     Partners: Female   Social History Narrative    Family History: Mother:  Father:  Daughter(s): alive 15 yrs Son(s): alive 7,20 yrs 3 brother(s) , 5 sister(s) . 2 son(s) , 1    daughter(s) . Adopted patient.     Social History: Alcohol Use Patient uses alcohol, Drinks per occasion: 2, Drinks per w [place]: 0. Smoking Status Patient is a current every day    smoker, Received cessation counseling on: 02/12/2013. Marital Status: . Lives w ith: spouse. Occupation/W ork: employed full time    w [employer]. Education/School: has highschool diploma, college. [employer]ism: [employer]. Review of Systems - negative except as listed above      Objective:     Vitals:    12/16/21 1327   BP: 128/85   Pulse: 81   Resp: 16   Temp: 98.6 °F (37 °C)   SpO2: 93%   Weight: 245 lb (111.1 kg)   Height: 5' 7\" (1.702 m)       Physical Examination: General appearance - alert, well appearing, and in no distress  Back exam - full range of motion, no tenderness, palpable spasm or pain on motion  Neurological - alert, oriented, normal speech, no focal findings or movement disorder noted  Musculoskeletal - The left shoulder is  normal to inspection. The patient has diminished range with pain  . The shoulderis not tender to palpation . Bicep tendon: non-tender  The NEER test is negative. The John test:is positive   The Cross over test:is  negative. The Empty Can test:is  positive. Stressed ext rotation is:  positive. Stressed int rotation: negative. The Apprehension Sign is negative. The Lift off test is:  negative   Examination reveals the Painful Arch:  negative. The Labral Test is:  negative. The Sulcus Sign is:  negative. Extremities - peripheral pulses normal, no pedal edema, no clubbing or cyanosis  Skin - normal coloration and turgor, no rashes, no suspicious skin lesions noted   . Indications for study: Left shoulder pain    A limited  musculoskeletal ultrasound examination was performed on the left shoulder with the following findings: Biceps (LHB) tendon - long:  normal echogenic, linearly compact fibrillar appearance   Biceps (LHB) tendon - trans:  normal echogenic, tessellate compact fibrillar appearance   Subscapularis tendon - long: normal echogenic, linearly compact fibrillar birds-beak appearance   Subscapularis tendon - trans:  normal echogenic, tessellate compact fibrillar three-bundled appearance   Acromioclavicular joint - trans: normal joint-space with effusion   Subacromial (SA) impingement testing: Decreased maintenance of SA space and restricted supraspinatus glide  Supraspinatus tendon - long: normal echogenic, linearly compact fibrillar birds-beak appearance   Infraspinatus tendon - long: Hyper echogenic, linearly compact fibrillar birds-beak appearance   Teres minor tendon - long: normal echogenic, linearly compact fibrillar birds-beak appearance   Supraspinatus/Infraspinatus/Teres minor tendons - trans: Hypo-echogenic, tessellate compact fibrillar wagon-wheeled appearance     Impression: Partial tear of infraspinatus, subacromial impingement and AC joint arthritis    Scanned and Interpreted by Ashlee Kennedy MD [facility]          Assessment/ Plan:   Diagnoses and all orders for this visit:    1. Visit for [facility] health check  -     CBC W/O DIFF; Future  -     LIPID PANEL; Future  -     METABOLIC PANEL, COMPREHENSIVE; Future  -     PSA W/ REFLX FREE PSA; Future    2. Traumatic incomplete tear of left rotator cuff, initial encounter  -     XR SHOULDER LT AP/LAT MIN 2 V; Future    3. Arthritis of left knee  -     XR KNEE LT MIN 4 V; Future    4. Need for hepatitis C screening test  -     HEPATITIS C AB; Future                I have discussed the diagnosis with the patient and the intended plan as seen in the above orders. The patient has received an after-visit summary and questions were answered concerning future plans. Medication Side Effects and Warnings were discussed with patient,  Patient Labs were reviewed and or requested, and  Patient Past Records were reviewed and or requested  yes       Pt agrees to call or return to clinic and/or go to closest ER with any worsening of symptoms.   This may include, but not limited to increased fever (>100.4) with NSAIDS or Tylenol, increased edema, confusion, rash, worsening of presenting symptoms. Please note that this dictation was completed with [employer], the computer voice recognition software. Quite often unanticipated grammatical, syntax, homophones, and other interpretive errors are inadvertently transcribed by the computer software. Please disregard these errors. Please excuse any errors that have escaped final proofreading. Thank you.

## 2021-12-17 LAB
ALBUMIN SERPL-MCNC: 3.9 G/DL (ref 3.5–5)
ALBUMIN/GLOB SERPL: 1 {RATIO} (ref 1.1–2.2)
ALP SERPL-CCNC: 79 U/L (ref 45–117)
ALT SERPL-CCNC: 34 U/L (ref 12–78)
ANION GAP SERPL CALC-SCNC: 4 MMOL/L (ref 5–15)
AST SERPL-CCNC: 26 U/L (ref 15–37)
BILIRUB SERPL-MCNC: 1.4 MG/DL (ref 0.2–1)
BUN SERPL-MCNC: 13 MG/DL (ref 6–20)
BUN/CREAT SERPL: 13 (ref 12–20)
CALCIUM SERPL-MCNC: 9.1 MG/DL (ref 8.5–10.1)
CHLORIDE SERPL-SCNC: 107 MMOL/L (ref 97–108)
CHOLEST SERPL-MCNC: 162 MG/DL
CO2 SERPL-SCNC: 29 MMOL/L (ref 21–32)
COMMENT, HOLDF: NORMAL
CREAT SERPL-MCNC: 1.03 MG/DL (ref 0.7–1.3)
ERYTHROCYTE [DISTWIDTH] IN BLOOD BY AUTOMATED COUNT: 11.9 % (ref 11.5–14.5)
GLOBULIN SER CALC-MCNC: 3.9 G/DL (ref 2–4)
GLUCOSE SERPL-MCNC: 71 MG/DL (ref 65–100)
HCT VFR BLD AUTO: 42.4 % (ref 36.6–50.3)
HCV AB SERPL QL IA: NONREACTIVE
HDLC SERPL-MCNC: 58 MG/DL
HDLC SERPL: 2.8 {RATIO} (ref 0–5)
HGB BLD-MCNC: 13.4 G/DL (ref 12.1–17)
LDLC SERPL CALC-MCNC: 86 MG/DL (ref 0–100)
MCH RBC QN AUTO: 31.9 PG (ref 26–34)
MCHC RBC AUTO-ENTMCNC: 31.6 G/DL (ref 30–36.5)
MCV RBC AUTO: 101 FL (ref 80–99)
NRBC # BLD: 0 K/UL (ref 0–0.01)
NRBC BLD-RTO: 0 PER 100 WBC
PLATELET # BLD AUTO: 186 K/UL (ref 150–400)
PMV BLD AUTO: 10.8 FL (ref 8.9–12.9)
POTASSIUM SERPL-SCNC: 4.1 MMOL/L (ref 3.5–5.1)
PROT SERPL-MCNC: 7.8 G/DL (ref 6.4–8.2)
RBC # BLD AUTO: 4.2 M/UL (ref 4.1–5.7)
SAMPLES BEING HELD,HOLD: NORMAL
SODIUM SERPL-SCNC: 140 MMOL/L (ref 136–145)
TRIGL SERPL-MCNC: 90 MG/DL (ref ?–150)
VLDLC SERPL CALC-MCNC: 18 MG/DL
WBC # BLD AUTO: 4.7 K/UL (ref 4.1–11.1)

## 2021-12-18 LAB
PSA SERPL-MCNC: 0.5 NG/ML (ref 0–4)
REFLEX CRITERIA: NORMAL

## 2022-01-06 ENCOUNTER — HOSPITAL ENCOUNTER (OUTPATIENT)
Dept: PHYSICAL THERAPY | Age: 57
Discharge: HOME | End: 2022-01-06
Payer: COMMERCIAL

## 2022-01-06 DIAGNOSIS — S46.012A TRAUMATIC INCOMPLETE TEAR OF LEFT ROTATOR CUFF, INITIAL ENCOUNTER: ICD-10-CM

## 2022-01-06 PROCEDURE — 97161 PT EVAL LOW COMPLEX 20 MIN: CPT | Performed by: PHYSICAL THERAPIST

## 2022-01-06 PROCEDURE — 97110 THERAPEUTIC EXERCISES: CPT | Performed by: PHYSICAL THERAPIST

## 2022-01-06 NOTE — PROGRESS NOTES
PT INITIAL EVALUATION NOTE - [facility] 2-15    Patient Name: Stefany Thompson  Date:2022  : 1965  [x]  Patient  Verified  Payor: BLUE CROSS / Plan: [facility] PPO / Product Type: PPO /    In time: 835a  Out time: 920a  Total Treatment Time (min): 45  Total Timed Codes (min): 10  1:1 Treatment Time ( only): 10   Visit #: 1     Treatment Area: Traumatic incomplete tear of left rotator cuff, initial encounter [S46.782A]    SUBJECTIVE  Pain Level (0-10 scale): 6  Any medication changes, allergies to medications, adverse drug reactions, diagnosis change, or new procedure performed?: [] No    [x] Yes (see summary sheet for update)  Subjective:    Patient reports with acute onset of L shoulder pain and chronic L knee pain. He says that he was reaching overhead playing basketball and his shoulder got hit from behind. No known clicking, cracking, popping noted at the time, but occasionally he gets that now. Overall is better since onset, but still painful. Works for [employer] at [employer], and hasn't noticed any significant limitations at work. 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 Biggest limitations at this time is full motion of the shoulder and activity tolerance for playing basketball    Description of symptoms: anterior shoulder pain in the area of subacromial space  Aggravating Factors: reaching behind back  Alleviating Factors: IcyHot    Radiation: occasional radiation down biceps     OBJECTIVE/EXAMINATION  Posture:  Mild forward shoulder position on L, scapular protraction  Other Observations: Repeated scaption: early scapular return on L  Palpation: TTP over subacromial space/bicipital groove       (active/passive)  (active/passive)  Shoulder ROM:   R    L   Flexion   160deg   160deg   Abduction  160deg   150deg   IR   T12    L2 p!    ER   T4    T4    Joint Mobility Assessment: Glenohumeral: reduced inferior and posterior mobility L Flexibility: posterior capsular stretch: (+) for tightness    UPPER QUARTER   MUSCLE STRENGTH  KEY       R  L  0 - No Contraction   Flexion  --  --  1 - Trace    Extension --  --  2 - Poor    Abduction --  --  3 - Fair     IR  5  5  4 - Good    ER  5  4  5 - Normal    Mid-Trap --  --    Neurological: Reflexes / Sensations: nt  Special Tests: Luisa: (+)       Empty Can: (+)       10 min Therapeutic Exercise:  [x] See flow sheet :   Rationale: increase ROM and increase strength to improve the patients ability to use arm without pain          With   [] TE   [] TA   [] Neuro   [] SC   [] other: Patient Education: [x] Review HEP    [] Progressed/Changed HEP based on:   [] positioning   [] body mechanics   [] transfers   [] heat/ice application    [] other:      Other Objective/Functional Measures: FOTO Functional Measure: 24/100    Pain Level (0-10 scale) post treatment: 5    ASSESSMENT/Changes in Function:     [x]  See Plan of SALO Bernstein DPT 1/6/2022

## 2022-01-06 NOTE — PROGRESS NOTES
Physical Therapy at [facility],   a part of [facility], [address] [place] [address], [address]  Phone: [phone]  Fax: [phone]    Plan of Care/Statement of Necessity for Physical Therapy Services  2-15    Patient name: Stefano Cerda  : 1965  Provider#: [id number]  Referral source: Jorge Kebede MD      Medical/Treatment Diagnosis: Traumatic incomplete tear of left rotator cuff, initial encounter Berta Stinson     Prior Hospitalization: see medical history     Comorbidities: obesity  Prior Level of Function: able to play basketball without shoulder pain  Medications: Verified on Patient Summary List    Start of Care: 22      Onset Date: 2021       The Plan of Care and following information is based on the information from the initial evaluation. Assessment/ key information: Patient reports with acute onset of L shoulder pain from a basketball-related injury. He has some reduced ROM, strength, and function in L shoulder. Symptom presentation consistent with rotator cuff tendinopathy of infraspinatus, as well symptoms of subacromial impingement. He also reports with chronic knee pain. Has been diagnosed with tricompartmental arthritic changes. Did not assess this today, but plan to assess at a later date as shoulder symptoms improve.      Evaluation Complexity History MEDIUM  Complexity : 1-2 comorbidities / personal factors will impact the outcome/ POC ; Examination HIGH Complexity : 4+ Standardized tests and measures addressing body structure, function, activity limitation and / or participation in recreation  ;Presentation LOW Complexity : Stable, uncomplicated  ;Clinical Decision Making HIGH Complexity : FOTO score of 1- 25   Overall Complexity Rating: LOW     Problem List: pain affecting function, decrease ROM, decrease strength, decrease ADL/ functional abilitiies, decrease activity tolerance and decrease flexibility/ joint mobility   Treatment Plan may include any combination of the following: Therapeutic exercise, Therapeutic activities, Neuromuscular re-education, Physical agent/modality, Manual therapy, Patient education and Self Care training  Patient / Family readiness to learn indicated by: asking questions and interest  Persons(s) to be included in education: patient (P)  Barriers to Learning/Limitations: None  Patient Goal (s): better shoulder function  Patient Self Reported Health Status: good  Rehabilitation Potential: good    Long Term Goals: To be accomplished in 10-12 treatments:   1. Patient will be able to reach behind back without increase in symptoms   2. Pt will be able to self-manage care using updated HEP for improved independence   3. Improved FOTO score to 63 or better to demonstrate improved function   4. Pt will be able to return to playing basketball without significant shoulder discomfort  Frequency / Duration: Patient to be seen 1-2 times per week for 10-12 treatments. Patient/ Caregiver education and instruction: self care and exercises    [x]  Plan of care has been reviewed with ROSSY Simon PT, DPT 1/6/2022     ________________________________________________________________________    I certify that the above Therapy Services are being furnished while the patient is under my care. I agree with the treatment plan and certify that this therapy is necessary.     [de-identified] Signature:____________________  Date:____________Time: _________      Chago Parish MD

## 2022-01-12 ENCOUNTER — APPOINTMENT (OUTPATIENT)
Dept: PHYSICAL THERAPY | Age: 57
End: 2022-01-12
Payer: COMMERCIAL

## 2022-01-14 ENCOUNTER — APPOINTMENT (OUTPATIENT)
Dept: PHYSICAL THERAPY | Age: 57
End: 2022-01-14
Payer: COMMERCIAL

## 2022-01-18 ENCOUNTER — APPOINTMENT (OUTPATIENT)
Dept: PHYSICAL THERAPY | Age: 57
End: 2022-01-18
Payer: COMMERCIAL

## 2022-01-20 ENCOUNTER — APPOINTMENT (OUTPATIENT)
Dept: PHYSICAL THERAPY | Age: 57
End: 2022-01-20
Payer: COMMERCIAL

## 2022-01-25 ENCOUNTER — APPOINTMENT (OUTPATIENT)
Dept: PHYSICAL THERAPY | Age: 57
End: 2022-01-25
Payer: COMMERCIAL

## 2022-01-27 ENCOUNTER — APPOINTMENT (OUTPATIENT)
Dept: PHYSICAL THERAPY | Age: 57
End: 2022-01-27
Payer: COMMERCIAL

## 2022-02-15 NOTE — PROGRESS NOTES
Physical Therapy at [facility],   a part of [address] [place] [address]  [address] [address], [address] [place] [address], [address] [place] [address]  Phone: [phone]  Fax: [phone]    Discharge Summary  2-15    Patient name: Pawel Estrada  : 1965  Provider#: [id number]  Referral source: Jamshid Wang MD      Medical/Treatment Diagnosis: Traumatic incomplete tear of left rotator cuff, initial encounter Lulu Marking     Prior Hospitalization: see medical history     Comorbidities: obesity  Prior Level of Function: able to play basketball without shoulder pain  Medications: Verified on Patient Summary List     Start of Care: 22                                                                       Onset Date: 2021           Visits from Start of Care: 1     Missed Visits: --  Reporting Period : 22 to 22      ASSESSMENT/SUMMARY OF CARE: Mr. Raimundo Rodríguez was seen for 1 session regarding his L shoulder pain. Failed to return after his 22 session, citing COVID exposure, and will be considered discharged at this time. Instructed in HEP during course of care.      RECOMMENDATIONS:  [x]Discontinue therapy: []Patient has reached or is progressing toward set goals      [x]Patient is non-compliant or has abdicated      []Due to lack of appreciable progress towards set [address] Macarena Lauren PT, DPT 2/15/2022

## 2023-05-19 RX ORDER — SILDENAFIL 100 MG/1
100 TABLET, FILM COATED ORAL PRN
COMMUNITY
Start: 2017-10-25

## 2023-05-19 RX ORDER — NAPROXEN 500 MG/1
500 TABLET ORAL 2 TIMES DAILY WITH MEALS
COMMUNITY
Start: 2021-12-16

## 2024-04-16 ENCOUNTER — OFFICE VISIT (OUTPATIENT)
Facility: CLINIC | Age: 59
End: 2024-04-16
Payer: COMMERCIAL

## 2024-04-16 VITALS
SYSTOLIC BLOOD PRESSURE: 162 MMHG | RESPIRATION RATE: 16 BRPM | WEIGHT: 242 LBS | OXYGEN SATURATION: 98 % | BODY MASS INDEX: 37.98 KG/M2 | DIASTOLIC BLOOD PRESSURE: 104 MMHG | TEMPERATURE: 98.3 F | HEART RATE: 78 BPM | HEIGHT: 67 IN

## 2024-04-16 DIAGNOSIS — N50.89 ENLARGED TESTICLE: ICD-10-CM

## 2024-04-16 DIAGNOSIS — M17.0 PRIMARY OSTEOARTHRITIS OF BOTH KNEES: ICD-10-CM

## 2024-04-16 DIAGNOSIS — Z00.00 VISIT FOR WELL MAN HEALTH CHECK: Primary | ICD-10-CM

## 2024-04-16 DIAGNOSIS — I10 PRIMARY HYPERTENSION: ICD-10-CM

## 2024-04-16 DIAGNOSIS — Z12.11 SCREENING FOR COLORECTAL CANCER: ICD-10-CM

## 2024-04-16 DIAGNOSIS — Z12.12 SCREENING FOR COLORECTAL CANCER: ICD-10-CM

## 2024-04-16 LAB
ALBUMIN SERPL-MCNC: 3.7 G/DL (ref 3.5–5)
ALBUMIN/GLOB SERPL: 1 (ref 1.1–2.2)
ALP SERPL-CCNC: 75 U/L (ref 45–117)
ALT SERPL-CCNC: 31 U/L (ref 12–78)
ANION GAP SERPL CALC-SCNC: 1 MMOL/L (ref 5–15)
AST SERPL-CCNC: 24 U/L (ref 15–37)
BASOPHILS # BLD: 0 K/UL (ref 0–0.1)
BASOPHILS NFR BLD: 0 % (ref 0–1)
BILIRUB SERPL-MCNC: 1.1 MG/DL (ref 0.2–1)
BUN SERPL-MCNC: 12 MG/DL (ref 6–20)
BUN/CREAT SERPL: 11 (ref 12–20)
CALCIUM SERPL-MCNC: 9.3 MG/DL (ref 8.5–10.1)
CHLORIDE SERPL-SCNC: 109 MMOL/L (ref 97–108)
CHOLEST SERPL-MCNC: 151 MG/DL
CO2 SERPL-SCNC: 31 MMOL/L (ref 21–32)
CREAT SERPL-MCNC: 1.1 MG/DL (ref 0.7–1.3)
DIFFERENTIAL METHOD BLD: ABNORMAL
EOSINOPHIL # BLD: 0.1 K/UL (ref 0–0.4)
EOSINOPHIL NFR BLD: 1 % (ref 0–7)
ERYTHROCYTE [DISTWIDTH] IN BLOOD BY AUTOMATED COUNT: 11.2 % (ref 11.5–14.5)
GLOBULIN SER CALC-MCNC: 3.8 G/DL (ref 2–4)
GLUCOSE SERPL-MCNC: 111 MG/DL (ref 65–100)
HCT VFR BLD AUTO: 38.3 % (ref 36.6–50.3)
HDLC SERPL-MCNC: 51 MG/DL
HDLC SERPL: 3 (ref 0–5)
HGB BLD-MCNC: 12.4 G/DL (ref 12.1–17)
HIV 1+2 AB+HIV1 P24 AG SERPL QL IA: NONREACTIVE
HIV 1/2 RESULT COMMENT: NORMAL
IMM GRANULOCYTES # BLD AUTO: 0 K/UL (ref 0–0.04)
IMM GRANULOCYTES NFR BLD AUTO: 0 % (ref 0–0.5)
LDLC SERPL CALC-MCNC: 85.4 MG/DL (ref 0–100)
LYMPHOCYTES # BLD: 1.4 K/UL (ref 0.8–3.5)
LYMPHOCYTES NFR BLD: 28 % (ref 12–49)
MCH RBC QN AUTO: 31.3 PG (ref 26–34)
MCHC RBC AUTO-ENTMCNC: 32.4 G/DL (ref 30–36.5)
MCV RBC AUTO: 96.7 FL (ref 80–99)
MONOCYTES # BLD: 0.5 K/UL (ref 0–1)
MONOCYTES NFR BLD: 9 % (ref 5–13)
NEUTS SEG # BLD: 3 K/UL (ref 1.8–8)
NEUTS SEG NFR BLD: 62 % (ref 32–75)
NRBC # BLD: 0 K/UL (ref 0–0.01)
NRBC BLD-RTO: 0 PER 100 WBC
PLATELET # BLD AUTO: 246 K/UL (ref 150–400)
PMV BLD AUTO: 10.9 FL (ref 8.9–12.9)
POTASSIUM SERPL-SCNC: 4.2 MMOL/L (ref 3.5–5.1)
PROT SERPL-MCNC: 7.5 G/DL (ref 6.4–8.2)
PSA SERPL-MCNC: 1.1 NG/ML (ref 0.01–4)
RBC # BLD AUTO: 3.96 M/UL (ref 4.1–5.7)
SODIUM SERPL-SCNC: 141 MMOL/L (ref 136–145)
TRIGL SERPL-MCNC: 73 MG/DL
VLDLC SERPL CALC-MCNC: 14.6 MG/DL
WBC # BLD AUTO: 5 K/UL (ref 4.1–11.1)

## 2024-04-16 PROCEDURE — 99214 OFFICE O/P EST MOD 30 MIN: CPT | Performed by: FAMILY MEDICINE

## 2024-04-16 PROCEDURE — 3080F DIAST BP >= 90 MM HG: CPT | Performed by: FAMILY MEDICINE

## 2024-04-16 PROCEDURE — 99396 PREV VISIT EST AGE 40-64: CPT | Performed by: FAMILY MEDICINE

## 2024-04-16 PROCEDURE — 3077F SYST BP >= 140 MM HG: CPT | Performed by: FAMILY MEDICINE

## 2024-04-16 RX ORDER — HYDROCHLOROTHIAZIDE 25 MG/1
25 TABLET ORAL EVERY MORNING
Qty: 90 TABLET | Refills: 1 | Status: SHIPPED | OUTPATIENT
Start: 2024-04-16

## 2024-04-16 SDOH — ECONOMIC STABILITY: HOUSING INSECURITY
IN THE LAST 12 MONTHS, WAS THERE A TIME WHEN YOU DID NOT HAVE A STEADY PLACE TO SLEEP OR SLEPT IN A SHELTER (INCLUDING NOW)?: NO

## 2024-04-16 SDOH — ECONOMIC STABILITY: INCOME INSECURITY: HOW HARD IS IT FOR YOU TO PAY FOR THE VERY BASICS LIKE FOOD, HOUSING, MEDICAL CARE, AND HEATING?: NOT HARD AT ALL

## 2024-04-16 SDOH — ECONOMIC STABILITY: FOOD INSECURITY: WITHIN THE PAST 12 MONTHS, YOU WORRIED THAT YOUR FOOD WOULD RUN OUT BEFORE YOU GOT MONEY TO BUY MORE.: NEVER TRUE

## 2024-04-16 SDOH — ECONOMIC STABILITY: FOOD INSECURITY: WITHIN THE PAST 12 MONTHS, THE FOOD YOU BOUGHT JUST DIDN'T LAST AND YOU DIDN'T HAVE MONEY TO GET MORE.: NEVER TRUE

## 2024-04-16 ASSESSMENT — PATIENT HEALTH QUESTIONNAIRE - PHQ9
SUM OF ALL RESPONSES TO PHQ QUESTIONS 1-9: 0
2. FEELING DOWN, DEPRESSED OR HOPELESS: NOT AT ALL
SUM OF ALL RESPONSES TO PHQ QUESTIONS 1-9: 0
1. LITTLE INTEREST OR PLEASURE IN DOING THINGS: NOT AT ALL
SUM OF ALL RESPONSES TO PHQ9 QUESTIONS 1 & 2: 0

## 2024-04-16 NOTE — PROGRESS NOTES
Chief Complaint   Patient presents with    Annual Exam     Patient is here for a wellness visit.     Knee Pain     Patient is here for pain in both knee.      he is a 58 y.o. year old male who presents for CPE.  Complete Physical Exam Questions:    1.  Do you follow a low fat diet?  no  2.  Are you up to date on your Tdap (<10 years)?  No  3.  Have you ever had a Pneumovax vaccine (>65)?  No   PCV13 No   PPSV23 No  4.  Have you had Zoster vaccine (>60)? No  5.  Have you had the HPV - Gardasil (13- 26)? Not applicable  6.  Do you follow an exercise program?  No  7.  Do you smoke?  Yes If > 65 and smoker, have you had a abdominal aortic aneurysm ultrasound screen?  No  8.  Do you consider yourself overweight?  No  9.  Is there a family history of CAD< age 50?  No  10.  Is there a family history of Cancer?  No  11.  Do you know your Cancer risks? No  12.  Have you had a colonoscopy?  Yes  13. Have you been tested for HIV or other STI's? No HIV today(18-64 y/o)?No   14.  Have you had an EKG in the last five years(>50)?Unknown  15.  Have you had a PSA test done this year (50-69)? No    Other complaints:  Patient states he has pain in both knees and would like a FMLA form completed     Reviewed and agree with Nurse Note and duplicated in this note.  Reviewed PmHx, RxHx, FmHx, SocHx, AllgHx and updated and dated in the chart.    History reviewed. No pertinent family history.    Past Medical History:   Diagnosis Date    Erectile dysfunction     Knee pain, bilateral     Obesity     Obesity (BMI 35.0-39.9 without comorbidity)     Preventative health care       Social History     Socioeconomic History    Marital status:      Spouse name: None    Number of children: None    Years of education: None    Highest education level: None   Tobacco Use    Smoking status: Every Day    Smokeless tobacco: Never   Substance and Sexual Activity    Alcohol use: Yes    Drug use: No   Social History Narrative     college. [employer]: [employer]

## 2024-04-17 DIAGNOSIS — R73.9 ELEVATED BLOOD SUGAR: Primary | ICD-10-CM

## 2024-04-24 ENCOUNTER — HOSPITAL ENCOUNTER (OUTPATIENT)
Facility: HOSPITAL | Age: 59
Discharge: HOME | End: 2024-04-27
Attending: FAMILY MEDICINE
Payer: COMMERCIAL

## 2024-04-24 DIAGNOSIS — N50.89 ENLARGED TESTICLE: ICD-10-CM

## 2024-04-24 PROCEDURE — 76870 US EXAM SCROTUM: CPT

## 2024-04-26 ENCOUNTER — HOSPITAL ENCOUNTER (OUTPATIENT)
Dept: PHYSICAL THERAPY | Facility: HOSPITAL | Age: 59
Setting detail: RECURRING SERIES
Discharge: HOME | End: 2024-04-29
Payer: COMMERCIAL

## 2024-04-26 PROCEDURE — 97161 PT EVAL LOW COMPLEX 20 MIN: CPT | Performed by: PHYSICAL THERAPIST

## 2024-04-26 NOTE — THERAPY EVALUATION
Physical Therapy at [facility],   a part of [address], [address]  [place] [address]  Phone: [phone]  Fax: [phone]           PHYSICAL THERAPY - MEDICARE EVALUATION/PLAN OF CARE NOTE (updated 3/23)      Date: 2024          Patient Name:  Jaquan Cartwright :  1965   Medical   Diagnosis:  Primary osteoarthritis of both knees [M17.0] Treatment Diagnosis:  M25.561  RIGHT KNEE PAIN and M25.562  LEFT KNEE PAIN    Referral Source:  Jv Franklin MD Provider #:  [id number]                Insurance: Payor: BCBS / Plan: BCBS OUT OF STATE / Product Type: *No Product type* /      Patient  verified yes     Visit #   Current  / Total 1 12   Time   In / Out 1020a 1100a   Total Treatment Time 40   Total Timed Codes --   1:1 Treatment Time --      [facility] Totals Reminder:  bill using total billable   min of TIMED therapeutic procedures and modalities.   8-22 min = 1 unit; 23-37 min = 2 units; 38-52 min = 3 units;  53-67 min = 4 units; 68-82 min = 5 units           SUBJECTIVE  Pain Level (0-10 scale): 9/10  []constant []intermittent []improving []worsening []no change since onset    Any medication changes, allergies to medications, adverse drug reactions, diagnosis change, or new procedure performed?: [x] No    [] Yes (see summary sheet for update)  Medications: Verified on Patient Summary List    Subjective functional status/changes:     Progressive increase of bilateral Bilateral knee pain (L> R) over the past 1-2 years.  Worse with sitting > 5 minutes, stairs, standing/walking at work > 4 hours, squatting, twisting movements while standing.   Knees are stiff in AM then improves a she moves around in the day then is worse by evening if he does too much.    Radiograph FINDINGS:  No acute fractures or dislocations. Chronic ossific density adjacent to the  lateral tibial spine on the right. Moderate to severe left and mild to moderate  right

## 2024-04-29 DIAGNOSIS — N43.3 HYDROCELE, UNSPECIFIED HYDROCELE TYPE: Primary | ICD-10-CM

## 2024-04-30 ENCOUNTER — OFFICE VISIT (OUTPATIENT)
Facility: CLINIC | Age: 59
End: 2024-04-30
Payer: COMMERCIAL

## 2024-04-30 ENCOUNTER — HOSPITAL ENCOUNTER (OUTPATIENT)
Dept: PHYSICAL THERAPY | Facility: HOSPITAL | Age: 59
Setting detail: RECURRING SERIES
Discharge: HOME | End: 2024-05-03
Payer: COMMERCIAL

## 2024-04-30 VITALS
OXYGEN SATURATION: 95 % | DIASTOLIC BLOOD PRESSURE: 90 MMHG | HEIGHT: 67 IN | TEMPERATURE: 98.4 F | SYSTOLIC BLOOD PRESSURE: 141 MMHG | BODY MASS INDEX: 37.67 KG/M2 | HEART RATE: 82 BPM | WEIGHT: 240 LBS | RESPIRATION RATE: 16 BRPM

## 2024-04-30 DIAGNOSIS — I10 PRIMARY HYPERTENSION: Primary | ICD-10-CM

## 2024-04-30 DIAGNOSIS — I86.1 VARICOCELE: ICD-10-CM

## 2024-04-30 DIAGNOSIS — N52.9 ERECTILE DYSFUNCTION, UNSPECIFIED ERECTILE DYSFUNCTION TYPE: ICD-10-CM

## 2024-04-30 PROCEDURE — 99214 OFFICE O/P EST MOD 30 MIN: CPT | Performed by: FAMILY MEDICINE

## 2024-04-30 PROCEDURE — 3077F SYST BP >= 140 MM HG: CPT | Performed by: FAMILY MEDICINE

## 2024-04-30 PROCEDURE — 97110 THERAPEUTIC EXERCISES: CPT | Performed by: PHYSICAL THERAPIST

## 2024-04-30 PROCEDURE — 3080F DIAST BP >= 90 MM HG: CPT | Performed by: FAMILY MEDICINE

## 2024-04-30 RX ORDER — AMLODIPINE BESYLATE 5 MG/1
5 TABLET ORAL DAILY
Qty: 90 TABLET | Refills: 1 | Status: SHIPPED | OUTPATIENT
Start: 2024-04-30

## 2024-04-30 RX ORDER — SILDENAFIL 100 MG/1
50 TABLET, FILM COATED ORAL PRN
Qty: 30 TABLET | Refills: 3 | Status: SHIPPED | OUTPATIENT
Start: 2024-04-30

## 2024-04-30 NOTE — PROGRESS NOTES
Average packs/day: 0.5 packs/day for 10.0 years (5.0 ttl pk-yrs)     Types: Cigarettes    Smokeless tobacco: Never   Substance and Sexual Activity    Alcohol use: Yes    Drug use: No    Sexual activity: Yes     Partners: Female   Social History Narrative     college. [employer]: [employer].    s per w [place]: 0. Smoking Status Patient is a current every day  smoker, Received cessation counseling on: 2013. Marital Status: . Lives w ith: spouse. Occupation/W ork: employed full time  w [employer]. Education/School: has highschool diploma,    Family History: Mother:  Father:  Daughter(s): alive 12 yrs Son(s): alive 7,20 yrs 3 brother(s) , 5 sister(s) . 2 son(s) , 1  daughter(s) .  Adopted patient.  Social History: Alcohol Use Patient uses alcohol, Drinks per occasion: 2, Drink     Social Determinants of Health     Financial Resource Strain: Low Risk  (2024)    Overall Financial Resource Strain (CARDIA)     Difficulty of Paying Living Expenses: Not hard at all   Food Insecurity: No Food Insecurity (2024)    Hunger Vital Sign     Worried About Running Out of Food in the Last Year: Never true     Ran Out of Food in the Last Year: Never true   Transportation Needs: Unknown (2024)    PRAPARE - Transportation     Lack of Transportation (Non-Medical): No   Housing Stability: Unknown (2024)    Housing Stability Vital Sign     Unstable Housing in the Last Year: No        Review of Systems - negative except as listed above      Objective:     Vitals:    24 0926   BP: (!) 141/90   Pulse: 82   Resp: 16   Temp: 98.4 °F (36.9 °C)   SpO2: 95%   Weight: 108.9 kg (240 lb)   Height: 1.702 m (5' 7\")       Physical Examination: General Appearance: alert and oriented to person, place and time, well developed and well- nourished, in no acute distress  Skin: warm and dry, no rash or erythema  Head: normocephalic and atraumatic  Eyes: pupils equal, round, and reactive to light,

## 2024-04-30 NOTE — PROGRESS NOTES
PHYSICAL THERAPY - MEDICARE DAILY TREATMENT NOTE (updated 3/23)      Date: 2024          Patient Name:  Jaquan Cartwright :  1965   Medical   Diagnosis:  Primary osteoarthritis of both knees [M17.0] Treatment Diagnosis:  M25.561  RIGHT KNEE PAIN and M25.562  LEFT KNEE PAIN    Referral Source:  Jv Franklin MD Insurance:   Payor: BCBS / Plan: BCBS OUT OF STATE / Product Type: *No Product type* /                     Patient  verified yes     Visit #   Current  / Total 2 12   Time   In / Out 805a 855a   Total Treatment Time 50   Total Timed Codes 50   1:1 Treatment Time 50       BC Totals Reminder:  bill using total billable   min of TIMED therapeutic procedures and modalities.   8-22 min = 1 unit; 23-37 min = 2 units; 38-52 min = 3 units; 53-67 min = 4 units; 68-82 min = 5 units            SUBJECTIVE    Pain Level (0-10 scale): 6/10    Any medication changes, allergies to medications, adverse drug reactions, diagnosis change, or new procedure performed?: [x] No    [] Yes (see summary sheet for update)  Medications: Verified on Patient Summary List    Subjective functional status/changes:     Pt states that he is stiff in the mornings.    OBJECTIVE      Therapeutic Procedures:  Tx Min Billable or 1:1 Min (if diff from Tx Min) Procedure, Rationale, Specifics   50  [address]11 Therapeutic Exercise (timed):  increase ROM, strength, coordination, balance, and proprioception to improve patient's ability to progress to PLOF and address remaining functional goals. (see flow sheet as applicable)     Details if applicable:  prone knee flexion stretch with inferior patella mob   50     Total Total         [x]  Patient Education billed concurrently with other procedures   [x] Review HEP    [] Progressed/Changed HEP, detail:    [] Other detail:         Other Objective/Functional Measures  Prone knee flex  R  127   L 127    Pain Level at end of session (0-10 scale): 3/10      Assessment   Improved knee flexion

## 2024-04-30 NOTE — PATIENT INSTRUCTIONS
[email or web address] and enter H967 to learn more about \"DASH Diet: Care Instructions.\"  Current as of: September 20, 2023               Content Version: 14.0  © 2904-8596 [employer].   Care instructions adapted under license by [employer]. If you have questions about a medical condition or this instruction, always ask your healthcare professional. [employer] disclaims any warranty or liability for your use of this information.

## 2024-05-03 ENCOUNTER — HOSPITAL ENCOUNTER (OUTPATIENT)
Dept: PHYSICAL THERAPY | Facility: HOSPITAL | Age: 59
Setting detail: RECURRING SERIES
Discharge: HOME | End: 2024-05-06
Payer: COMMERCIAL

## 2024-05-03 PROCEDURE — 97110 THERAPEUTIC EXERCISES: CPT

## 2024-05-03 NOTE — PROGRESS NOTES
PHYSICAL THERAPY - MEDICARE DAILY TREATMENT NOTE (updated 3/23)      Date: 5/3/2024          Patient Name:  Jaquan Cartwright :  1965   Medical   Diagnosis:  Primary osteoarthritis of both knees [M17.0] Treatment Diagnosis:  M25.561  RIGHT KNEE PAIN and M25.562  LEFT KNEE PAIN    Referral Source:  Jv Franklin MD Insurance:   Payor: BCBS / Plan: BCBS OUT OF STATE / Product Type: *No Product type* /                     Patient  verified yes     Visit #   Current  / Total 3 12   Time   In / Out 745A 830A   Total Treatment Time 45   Total Timed Codes 45   1:1 Treatment Time 40       BC Totals Reminder:  bill using total billable   min of TIMED therapeutic procedures and modalities.   8-22 min = 1 unit; 23-37 min = 2 units; 38-52 min = 3 units; 53-67 min = 4 units; 68-82 min = 5 units            SUBJECTIVE    Pain Level (0-10 scale): 2/10    Any medication changes, allergies to medications, adverse drug reactions, diagnosis change, or new procedure performed?: [x] No    [] Yes (see summary sheet for update)  Medications: Verified on Patient Summary List    Subjective functional status/changes:     Pt reported feeling pretty good after last session and morning stiffness and when he is working long hours is the hardest on his knee pain.    The Student Physical Therapist Assistant participated in the delivery of services under the direction of Gretchen Doran PTA; directing the service, making the skilled judgment, and who is responsible for the supervision of treatment.     OBJECTIVE      Therapeutic Procedures:  Tx Min Billable or 1:1 Min (if diff from Tx Min) Procedure, Rationale, Specifics   45 40 48428 Therapeutic Exercise (timed):  increase ROM, strength, coordination, balance, and proprioception to improve patient's ability to progress to PLOF and address remaining functional goals. (see flow sheet as applicable)     Details if applicable:  PROM knee flexion stretch with inferior patella mob

## 2024-05-07 ENCOUNTER — HOSPITAL ENCOUNTER (OUTPATIENT)
Dept: PHYSICAL THERAPY | Facility: HOSPITAL | Age: 59
Setting detail: RECURRING SERIES
Discharge: HOME | End: 2024-05-10
Payer: COMMERCIAL

## 2024-05-07 PROCEDURE — 97110 THERAPEUTIC EXERCISES: CPT

## 2024-05-07 NOTE — PROGRESS NOTES
Total         [x]  Patient Education billed concurrently with other procedures   [x] Review HEP    [] Progressed/Changed HEP, detail:    [] Other detail:         Other Objective/Functional Measures  --    Pain Level at end of session (0-10 scale): 0/10      Assessment   Pt tolerated session well. Advanced from on the table ex to off the table focus. Pt responded positively to the increase activity with SLS ball throw.   Patient will continue to benefit from skilled PT / OT services to modify and progress therapeutic interventions, analyze and address functional mobility deficits, analyze and address ROM deficits, analyze and address strength deficits, analyze and address soft tissue restrictions, analyze and cue for proper movement patterns, analyze and address imbalance/dizziness, and instruct in home and community integration to address functional deficits and attain remaining goals.    Progress toward goals / Updated goals:  []  See Progress Note/Recertification    Short Term Goals: To be accomplished in 4-6 treatments.  1)  Pt will be Independent with HEP.  2) Pt will be able to Stand >/= 4 hours without increased pain.  3) Pt will be able to Sit >/= 15 minutes without pain.  4) Pt will be able to Ambulate >/= 1 mile without pain.      Long Term Goals: To be accomplished in 8-12 treatments.  1) Pt will be able to Navigate one flight of stairs without pain.  2) Pt will be able to change directions without increase of pain.         3) Pt will be able to Sit for >/= 30 minutes without pain.  4) Pt will be able to Ambulate >/= 2 miles without pain.  5) Pt will be able to squat to retrieve item from ground without increase of pain.      Frequency / Duration: Patient to be seen 2 times per week for 8-12 treatments.      PLAN  Yes  Continue plan of care  Re-Cert Due: 12 visits  [x]  Upgrade activities as tolerated  []  Discharge due to:  []  Other:      JOSE ALEJANDRO Luo       5/7/2024       7:40 AM

## 2024-06-05 ENCOUNTER — OFFICE VISIT (OUTPATIENT)
Facility: CLINIC | Age: 59
End: 2024-06-05
Payer: COMMERCIAL

## 2024-06-05 VITALS
SYSTOLIC BLOOD PRESSURE: 131 MMHG | OXYGEN SATURATION: 96 % | WEIGHT: 240 LBS | TEMPERATURE: 98.3 F | BODY MASS INDEX: 37.67 KG/M2 | HEIGHT: 67 IN | HEART RATE: 77 BPM | RESPIRATION RATE: 16 BRPM | DIASTOLIC BLOOD PRESSURE: 86 MMHG

## 2024-06-05 DIAGNOSIS — M17.0 PRIMARY OSTEOARTHRITIS OF BOTH KNEES: ICD-10-CM

## 2024-06-05 DIAGNOSIS — I10 PRIMARY HYPERTENSION: Primary | ICD-10-CM

## 2024-06-05 PROCEDURE — 3079F DIAST BP 80-89 MM HG: CPT | Performed by: FAMILY MEDICINE

## 2024-06-05 PROCEDURE — 3075F SYST BP GE 130 - 139MM HG: CPT | Performed by: FAMILY MEDICINE

## 2024-06-05 PROCEDURE — 99214 OFFICE O/P EST MOD 30 MIN: CPT | Performed by: FAMILY MEDICINE

## 2024-06-05 RX ORDER — AMLODIPINE BESYLATE 10 MG/1
10 TABLET ORAL DAILY
Qty: 90 TABLET | Refills: 0 | Status: SHIPPED | OUTPATIENT
Start: 2024-06-05

## 2024-06-05 NOTE — PROGRESS NOTES
Chief Complaint   Patient presents with    Hypertension     Patient is here for a follow up.      Pt who presents for follow up of a pre-existing problem of hypertension.    Diet and Lifestyle: generally follows a low sodium diet  Home BP Monitoring: Reviewed blood pressure diary with patient at todays visit.  Use of agents associated with hypertension:  Yes.  Cardiovascular ROS: taking medications as instructed, no medication side effects noted, no TIA's, no chest pain on exertion, no dyspnea on exertion, no swelling of ankles.     New concerns: none.     Reviewed and agree with Nurse Note and duplicated in this note.  Reviewed PmHx, RxHx, FmHx, SocHx, AllgHx and updated and dated in the chart.    No family history on file.    Past Medical History:   Diagnosis Date    Erectile dysfunction     Knee pain, bilateral     Obesity     Obesity (BMI 35.0-39.9 without comorbidity)     Preventative health care       Social History     Socioeconomic History    Marital status:    Tobacco Use    Smoking status: Every Day     Current packs/day: 0.50     Average packs/day: 0.5 packs/day for 10.0 years (5.0 ttl pk-yrs)     Types: Cigarettes    Smokeless tobacco: Never   Substance and Sexual Activity    Alcohol use: Yes    Drug use: No    Sexual activity: Yes     Partners: Female   Social History Narrative     college. [employer]: [email or web address].    s per w [place]: 0. Smoking Status Patient is a current every day  smoker, Received cessation counseling on: 2013. Marital Status: . Lives w ith: spouse. Occupation/W ork: employed full time  w [employer]. Education/School: has highschool diploma,    Family History: Mother:  Father:  Daughter(s): alive 12 yrs Son(s): alive 7,20 yrs 3 brother(s) , 5 sister(s) . 2 son(s) , 1  daughter(s) .  Adopted patient.  Social History: Alcohol Use Patient uses alcohol, Drinks per occasion: 2, Drink     Social Determinants of Health     Financial Resource Strain:

## 2024-06-07 RX ORDER — TADALAFIL 20 MG/1
20 TABLET ORAL DAILY PRN
Qty: 10 TABLET | Refills: 0 | Status: SHIPPED | OUTPATIENT
Start: 2024-06-07

## 2024-08-30 RX ORDER — AMLODIPINE BESYLATE 10 MG/1
10 TABLET ORAL DAILY
Qty: 90 TABLET | Refills: 0 | Status: SHIPPED | OUTPATIENT
Start: 2024-08-30

## 2024-10-25 DIAGNOSIS — I10 PRIMARY HYPERTENSION: ICD-10-CM

## 2024-10-25 RX ORDER — AMLODIPINE BESYLATE 5 MG/1
5 TABLET ORAL DAILY
Qty: 90 TABLET | Refills: 1 | Status: SHIPPED | OUTPATIENT
Start: 2024-10-25

## 2025-05-02 ENCOUNTER — OFFICE VISIT (OUTPATIENT)
Age: 60
End: 2025-05-02
Payer: COMMERCIAL

## 2025-05-02 VITALS
OXYGEN SATURATION: 96 % | HEART RATE: 74 BPM | TEMPERATURE: 97.5 F | HEIGHT: 67 IN | BODY MASS INDEX: 37.79 KG/M2 | RESPIRATION RATE: 16 BRPM | WEIGHT: 240.8 LBS | DIASTOLIC BLOOD PRESSURE: 81 MMHG | SYSTOLIC BLOOD PRESSURE: 132 MMHG

## 2025-05-02 DIAGNOSIS — M17.0 PRIMARY OSTEOARTHRITIS OF BOTH KNEES: ICD-10-CM

## 2025-05-02 DIAGNOSIS — E66.812 CLASS 2 SEVERE OBESITY DUE TO EXCESS CALORIES WITH SERIOUS COMORBIDITY AND BODY MASS INDEX (BMI) OF 37.0 TO 37.9 IN ADULT (HCC): ICD-10-CM

## 2025-05-02 DIAGNOSIS — I10 PRIMARY HYPERTENSION: Primary | ICD-10-CM

## 2025-05-02 DIAGNOSIS — Z00.00 ANNUAL PHYSICAL EXAM: ICD-10-CM

## 2025-05-02 DIAGNOSIS — E66.01 CLASS 2 SEVERE OBESITY DUE TO EXCESS CALORIES WITH SERIOUS COMORBIDITY AND BODY MASS INDEX (BMI) OF 37.0 TO 37.9 IN ADULT (HCC): ICD-10-CM

## 2025-05-02 DIAGNOSIS — F17.210 TOBACCO DEPENDENCE DUE TO CIGARETTES: ICD-10-CM

## 2025-05-02 DIAGNOSIS — N43.3 HYDROCELE OF TESTIS: ICD-10-CM

## 2025-05-02 DIAGNOSIS — N52.9 ERECTILE DYSFUNCTION, UNSPECIFIED ERECTILE DYSFUNCTION TYPE: ICD-10-CM

## 2025-05-02 DIAGNOSIS — Z13.1 DIABETES MELLITUS SCREENING: ICD-10-CM

## 2025-05-02 PROCEDURE — 3075F SYST BP GE 130 - 139MM HG: CPT | Performed by: STUDENT IN AN ORGANIZED HEALTH CARE EDUCATION/TRAINING PROGRAM

## 2025-05-02 PROCEDURE — 3079F DIAST BP 80-89 MM HG: CPT | Performed by: STUDENT IN AN ORGANIZED HEALTH CARE EDUCATION/TRAINING PROGRAM

## 2025-05-02 PROCEDURE — 99204 OFFICE O/P NEW MOD 45 MIN: CPT | Performed by: STUDENT IN AN ORGANIZED HEALTH CARE EDUCATION/TRAINING PROGRAM

## 2025-05-02 PROCEDURE — 99386 PREV VISIT NEW AGE 40-64: CPT | Performed by: STUDENT IN AN ORGANIZED HEALTH CARE EDUCATION/TRAINING PROGRAM

## 2025-05-02 RX ORDER — AMLODIPINE BESYLATE 5 MG/1
5 TABLET ORAL DAILY
Qty: 90 TABLET | Refills: 3 | Status: SHIPPED | OUTPATIENT
Start: 2025-05-02

## 2025-05-02 RX ORDER — AMLODIPINE BESYLATE 5 MG/1
5 TABLET ORAL DAILY
Qty: 90 TABLET | Refills: 3 | Status: SHIPPED | OUTPATIENT
Start: 2025-05-02 | End: 2025-05-02

## 2025-05-02 RX ORDER — SILDENAFIL 100 MG/1
100 TABLET, FILM COATED ORAL DAILY PRN
Qty: 30 TABLET | Refills: 2 | Status: SHIPPED | OUTPATIENT
Start: 2025-05-02 | End: 2025-05-02

## 2025-05-02 RX ORDER — SILDENAFIL 100 MG/1
100 TABLET, FILM COATED ORAL DAILY PRN
Qty: 30 TABLET | Refills: 2 | Status: SHIPPED | OUTPATIENT
Start: 2025-05-02

## 2025-05-02 SDOH — ECONOMIC STABILITY: FOOD INSECURITY: WITHIN THE PAST 12 MONTHS, THE FOOD YOU BOUGHT JUST DIDN'T LAST AND YOU DIDN'T HAVE MONEY TO GET MORE.: NEVER TRUE

## 2025-05-02 SDOH — ECONOMIC STABILITY: FOOD INSECURITY: WITHIN THE PAST 12 MONTHS, YOU WORRIED THAT YOUR FOOD WOULD RUN OUT BEFORE YOU GOT MONEY TO BUY MORE.: NEVER TRUE

## 2025-05-02 ASSESSMENT — PATIENT HEALTH QUESTIONNAIRE - PHQ9
SUM OF ALL RESPONSES TO PHQ QUESTIONS 1-9: 0
2. FEELING DOWN, DEPRESSED OR HOPELESS: NOT AT ALL
1. LITTLE INTEREST OR PLEASURE IN DOING THINGS: NOT AT ALL
SUM OF ALL RESPONSES TO PHQ QUESTIONS 1-9: 0

## 2025-05-02 NOTE — PROGRESS NOTES
Chief Complaint   Patient presents with    Medication Check    Establish Care     Previous PCP was Dr. Franklin, [facility].     \"Have you been to the ER, urgent care clinic since your last visit?  Hospitalized since your last visit?\"    NO    “Have you seen or consulted any other health care providers outside of [facility] since your last visit?”    NO          Click Here for Release of Records Request             5/2/2025     8:15 AM   PHQ-9    Little interest or pleasure in doing things 0   Feeling down, depressed, or hopeless 0   PHQ-2 Score 0   PHQ-9 Total Score 0           Financial Resource Strain: Low Risk  (4/16/2024)    Overall Financial Resource Strain (CARDIA)     Difficulty of Paying Living Expenses: Not hard at all      Food Insecurity: No Food Insecurity (5/2/2025)    Hunger Vital Sign     Worried About Running Out of Food in the Last Year: Never true     Ran Out of Food in the Last Year: Never true          Health Maintenance Due   Topic Date Due    Hepatitis B vaccine (1 of 3 - 19+ 3-dose series) Never done    DTaP/Tdap/Td vaccine (1 - Tdap) Never done    Pneumococcal 50+ years Vaccine (1 of 2 - PCV) Never done    Diabetes screen  Never done    Shingles vaccine (1 of 2) Never done    COVID-19 Vaccine (1 - 2024-25 season) Never done    Depression Screen  04/16/2025

## 2025-05-02 NOTE — PROGRESS NOTES
Jaquan Cartwright (:  1965) is a 59 y.o. male, New patient, here for evaluation of the following chief complaint(s):  Medication Check and Establish Care (Previous PCP was Dr. Franklin, [facility].)         Assessment & Plan  1. Hypertension: Stable. Blood pressure readings from the previous year were consistently elevated, with a notable reading of 164/106. Current reading is within the target range of 132/81, indicating effective control with amlodipine 5 mg daily.  - Goal is to maintain blood pressure below 140/90. Advised to monitor blood pressure at home and recheck after a 10-minute rest period if initially high.  - Prescription for amlodipine 5 mg daily, with a supply for 90 days and three refills, will be sent to [facility] pharmacy.  - If blood pressure remains consistently above the target range, further intervention may be necessary.    2. Erectile dysfunction: Expressed interest in trying Viagra 100 mg daily as needed.  - Prescription for Viagra 100 mg daily as needed, with a supply for 30 days and two refills, will be sent to [facility] pharmacy.  - Advised to use the Rheonix dacia to find the lowest cost option.  - Discussed previous use of tadalafil 20 mg with moderate success.    3. Obesity: BMI is currently 37.  Chronic, unstable  - Counseled on the importance of maintaining a healthy diet and regular exercise regimen. Advised to limit intake of sugary drinks and ensure consumption of at least one serving of fruits and vegetables daily.  - Aiming for 150 minutes of moderate-intensity exercise per week is recommended. Blood work will be ordered today to assess overall health status.  - If unable to achieve weight loss goals through diet and exercise alone, pharmacological intervention may be considered.    4. Bilateral knee arthritis: History of bilateral knee arthritis, left worse than right.  - Advised to continue with strengthening exercises recommended during physical

## 2025-05-03 LAB
ALBUMIN SERPL-MCNC: 3.6 G/DL (ref 3.5–5)
ALBUMIN/GLOB SERPL: 0.9 (ref 1.1–2.2)
ALP SERPL-CCNC: 82 U/L (ref 45–117)
ALT SERPL-CCNC: 23 U/L (ref 12–78)
ANION GAP SERPL CALC-SCNC: 4 MMOL/L (ref 2–12)
AST SERPL-CCNC: 24 U/L (ref 15–37)
BILIRUB SERPL-MCNC: 0.6 MG/DL (ref 0.2–1)
BUN SERPL-MCNC: 13 MG/DL (ref 6–20)
BUN/CREAT SERPL: 11 (ref 12–20)
CALCIUM SERPL-MCNC: 8.8 MG/DL (ref 8.5–10.1)
CHLORIDE SERPL-SCNC: 107 MMOL/L (ref 97–108)
CHOLEST SERPL-MCNC: 151 MG/DL
CO2 SERPL-SCNC: 28 MMOL/L (ref 21–32)
CREAT SERPL-MCNC: 1.15 MG/DL (ref 0.7–1.3)
ERYTHROCYTE [DISTWIDTH] IN BLOOD BY AUTOMATED COUNT: 12 % (ref 11.5–14.5)
EST. AVERAGE GLUCOSE BLD GHB EST-MCNC: ABNORMAL MG/DL
GLOBULIN SER CALC-MCNC: 3.9 G/DL (ref 2–4)
GLUCOSE SERPL-MCNC: 102 MG/DL (ref 65–100)
HBA1C MFR BLD: <3.8 % (ref 4–5.6)
HCT VFR BLD AUTO: 30.8 % (ref 36.6–50.3)
HDLC SERPL-MCNC: 54 MG/DL
HDLC SERPL: 2.8 (ref 0–5)
HGB BLD-MCNC: 9.5 G/DL (ref 12.1–17)
LDLC SERPL CALC-MCNC: 81.8 MG/DL (ref 0–100)
MCH RBC QN AUTO: 29.4 PG (ref 26–34)
MCHC RBC AUTO-ENTMCNC: 30.8 G/DL (ref 30–36.5)
MCV RBC AUTO: 95.4 FL (ref 80–99)
NRBC # BLD: 0 K/UL (ref 0–0.01)
NRBC BLD-RTO: 0 PER 100 WBC
PLATELET # BLD AUTO: 300 K/UL (ref 150–400)
PMV BLD AUTO: 10 FL (ref 8.9–12.9)
POTASSIUM SERPL-SCNC: 4.3 MMOL/L (ref 3.5–5.1)
PROT SERPL-MCNC: 7.5 G/DL (ref 6.4–8.2)
RBC # BLD AUTO: 3.23 M/UL (ref 4.1–5.7)
SODIUM SERPL-SCNC: 139 MMOL/L (ref 136–145)
TRIGL SERPL-MCNC: 76 MG/DL
VLDLC SERPL CALC-MCNC: 15.2 MG/DL
WBC # BLD AUTO: 4.1 K/UL (ref 4.1–11.1)

## 2025-05-05 ENCOUNTER — RESULTS FOLLOW-UP (OUTPATIENT)
Age: 60
End: 2025-05-05

## 2025-05-05 DIAGNOSIS — K21.9 GASTROESOPHAGEAL REFLUX DISEASE, UNSPECIFIED WHETHER ESOPHAGITIS PRESENT: ICD-10-CM

## 2025-05-05 DIAGNOSIS — D64.9 ANEMIA, UNSPECIFIED TYPE: Primary | ICD-10-CM

## 2025-05-05 RX ORDER — PANTOPRAZOLE SODIUM 40 MG/1
40 TABLET, DELAYED RELEASE ORAL
Qty: 90 TABLET | Refills: 1 | Status: SHIPPED | OUTPATIENT
Start: 2025-05-05

## 2025-05-05 NOTE — RESULT ENCOUNTER NOTE
CBC remarkable for anemia to 9.5, which is new for patient.  Will order anemia labs and follow-up later this week  Creatinine stable  A1c low, unreliable due to anemia  Lipid panel normal    Called patient reviewed lab results and the plan.  All questions were answered.

## 2025-05-06 ENCOUNTER — OFFICE VISIT (OUTPATIENT)
Age: 60
End: 2025-05-06
Payer: COMMERCIAL

## 2025-05-06 VITALS
WEIGHT: 243.8 LBS | BODY MASS INDEX: 38.27 KG/M2 | OXYGEN SATURATION: 98 % | HEART RATE: 71 BPM | RESPIRATION RATE: 16 BRPM | DIASTOLIC BLOOD PRESSURE: 86 MMHG | TEMPERATURE: 98 F | SYSTOLIC BLOOD PRESSURE: 138 MMHG | HEIGHT: 67 IN

## 2025-05-06 DIAGNOSIS — D64.9 ANEMIA, UNSPECIFIED TYPE: Primary | ICD-10-CM

## 2025-05-06 DIAGNOSIS — K21.9 GASTROESOPHAGEAL REFLUX DISEASE, UNSPECIFIED WHETHER ESOPHAGITIS PRESENT: ICD-10-CM

## 2025-05-06 PROCEDURE — 99214 OFFICE O/P EST MOD 30 MIN: CPT | Performed by: STUDENT IN AN ORGANIZED HEALTH CARE EDUCATION/TRAINING PROGRAM

## 2025-05-06 PROCEDURE — 3079F DIAST BP 80-89 MM HG: CPT | Performed by: STUDENT IN AN ORGANIZED HEALTH CARE EDUCATION/TRAINING PROGRAM

## 2025-05-06 PROCEDURE — 3075F SYST BP GE 130 - 139MM HG: CPT | Performed by: STUDENT IN AN ORGANIZED HEALTH CARE EDUCATION/TRAINING PROGRAM

## 2025-05-06 PROCEDURE — G2211 COMPLEX E/M VISIT ADD ON: HCPCS | Performed by: STUDENT IN AN ORGANIZED HEALTH CARE EDUCATION/TRAINING PROGRAM

## 2025-05-06 RX ORDER — FERROUS SULFATE 325(65) MG
325 TABLET, DELAYED RELEASE (ENTERIC COATED) ORAL EVERY OTHER DAY
Qty: 90 TABLET | Refills: 1 | Status: SHIPPED | OUTPATIENT
Start: 2025-05-06

## 2025-05-06 ASSESSMENT — PATIENT HEALTH QUESTIONNAIRE - PHQ9
SUM OF ALL RESPONSES TO PHQ QUESTIONS 1-9: 0
SUM OF ALL RESPONSES TO PHQ QUESTIONS 1-9: 0
1. LITTLE INTEREST OR PLEASURE IN DOING THINGS: NOT AT ALL
SUM OF ALL RESPONSES TO PHQ QUESTIONS 1-9: 0
2. FEELING DOWN, DEPRESSED OR HOPELESS: NOT AT ALL
SUM OF ALL RESPONSES TO PHQ QUESTIONS 1-9: 0

## 2025-05-06 NOTE — PROGRESS NOTES
Jaquan Cartwright (:  1965) is a 59 y.o. male, Established patient, here for evaluation of the following chief complaint(s):  Follow-up (Anemia)         Assessment & Plan  1. Anemia: Hemoglobin levels have decreased to 9.5. Platelet and white blood cell counts remain within normal ranges. Cholesterol levels, as well as kidney and liver functions, are satisfactory.  - Comprehensive blood workup will be conducted, including tests for iron, B12, folate, and a peripheral smear.  - Iron supplements prescribed to be taken every other day, preferably in the morning with a glass of juice, and to wait for at least 30 minutes before consuming any other medications or food.  - Hemoglobin levels will be reassessed in 4 weeks.  - Alternative formulations or iron infusion may be considered if necessary.    2. Dyspepsia: Reports occasional gas and stomach pain, which improves after burping.  - Protonix (pantoprazole) prescribed to be taken at bedtime to help reduce stomach acid and alleviate symptoms.    3. Elevated fasting glucose: Fasting glucose levels have shown a slight increase, with previous readings at 111 and the most recent one at 102.  - This will be monitored closely, and further action will be taken based on future blood work results.    4. Medication management: Currently taking amlodipine 5 mg once a day.  - Has not picked up Viagra prescription.    Follow-up  - Follow up in 4 weeks.    Results  Labs   - Complete Blood Count (CBC): Hemoglobin: 9.5 g/dL, Platelets: normal, White count: normal   - Hemoglobin A1c: Less than 3.8%   - Cholesterol: Normal   - Kidney function: Normal   - Liver function: Normal   - Fasting glucose: 2025, 102 mg/dL   - Fasting glucose: 111 mg/dL    Imaging   - Colonoscopy: Two polyps removed in the transverse colon, another one in the sigmoid colon, some internal hemorrhoids, otherwise normal  1. Anemia, unspecified type  -     Path Review, Smear  -     Vitamin B12 & Folate  -

## 2025-05-06 NOTE — PROGRESS NOTES
Chief Complaint   Patient presents with    Follow-up     Anemia     \"Have you been to the ER, urgent care clinic since your last visit?  Hospitalized since your last visit?\"    NO    “Have you seen or consulted any other health care providers outside of [facility] since your last visit?”    NO          Click Here for Release of Records Request             5/2/2025     8:15 AM   PHQ-9    Little interest or pleasure in doing things 0   Feeling down, depressed, or hopeless 0   PHQ-2 Score 0   PHQ-9 Total Score 0           Financial Resource Strain: Low Risk  (4/16/2024)    Overall Financial Resource Strain (CARDIA)     Difficulty of Paying Living Expenses: Not hard at all      Food Insecurity: No Food Insecurity (5/2/2025)    Hunger Vital Sign     Worried About Running Out of Food in the Last Year: Never true     Ran Out of Food in the Last Year: Never true          Health Maintenance Due   Topic Date Due    Hepatitis B vaccine (1 of 3 - 19+ 3-dose series) Never done    DTaP/Tdap/Td vaccine (1 - Tdap) Never done    Pneumococcal 50+ years Vaccine (1 of 2 - PCV) Never done    Shingles vaccine (1 of 2) Never done    COVID-19 Vaccine (1 - 2024-25 season) Never done

## 2025-05-07 LAB
FERRITIN SERPL-MCNC: 7 NG/ML (ref 26–388)
FOLATE SERPL-MCNC: 4.4 NG/ML (ref 5–21)
IRON SATN MFR SERPL: 5 % (ref 20–50)
IRON SERPL-MCNC: 22 UG/DL (ref 35–150)
PERIPHERAL SMEAR, MD REVIEW: NORMAL
TIBC SERPL-MCNC: 414 UG/DL (ref 250–450)
VIT B12 SERPL-MCNC: 101 PG/ML (ref 193–986)

## 2025-05-09 ENCOUNTER — RESULTS FOLLOW-UP (OUTPATIENT)
Age: 60
End: 2025-05-09

## 2025-05-09 DIAGNOSIS — E53.8 B12 DEFICIENCY: Primary | ICD-10-CM

## 2025-05-09 DIAGNOSIS — E53.8 FOLATE DEFICIENCY: ICD-10-CM

## 2025-05-09 RX ORDER — FOLIC ACID 0.4 MG
1 TABLET ORAL DAILY
Qty: 75 TABLET | Refills: 3 | Status: SHIPPED | OUTPATIENT
Start: 2025-05-09

## 2025-05-10 NOTE — RESULT ENCOUNTER NOTE
B12, folate and iron low.  Patient already on iron supplements, so we will start patient on B12 and folate supplements

## 2025-05-30 ENCOUNTER — OFFICE VISIT (OUTPATIENT)
Age: 60
End: 2025-05-30
Payer: COMMERCIAL

## 2025-05-30 VITALS
SYSTOLIC BLOOD PRESSURE: 120 MMHG | WEIGHT: 241.4 LBS | OXYGEN SATURATION: 97 % | RESPIRATION RATE: 16 BRPM | TEMPERATURE: 97.5 F | DIASTOLIC BLOOD PRESSURE: 78 MMHG | HEIGHT: 67 IN | BODY MASS INDEX: 37.89 KG/M2 | HEART RATE: 70 BPM

## 2025-05-30 DIAGNOSIS — D50.9 IRON DEFICIENCY ANEMIA, UNSPECIFIED IRON DEFICIENCY ANEMIA TYPE: ICD-10-CM

## 2025-05-30 DIAGNOSIS — K21.9 GASTROESOPHAGEAL REFLUX DISEASE, UNSPECIFIED WHETHER ESOPHAGITIS PRESENT: ICD-10-CM

## 2025-05-30 DIAGNOSIS — E53.8 B12 DEFICIENCY: ICD-10-CM

## 2025-05-30 DIAGNOSIS — M17.0 PRIMARY OSTEOARTHRITIS OF BOTH KNEES: Primary | ICD-10-CM

## 2025-05-30 LAB
ERYTHROCYTE [DISTWIDTH] IN BLOOD BY AUTOMATED COUNT: 14.2 % (ref 11.5–14.5)
HCT VFR BLD AUTO: 32.3 % (ref 36.6–50.3)
HGB BLD-MCNC: 9.6 G/DL (ref 12.1–17)
MCH RBC QN AUTO: 27.4 PG (ref 26–34)
MCHC RBC AUTO-ENTMCNC: 29.7 G/DL (ref 30–36.5)
MCV RBC AUTO: 92.3 FL (ref 80–99)
NRBC # BLD: 0 K/UL (ref 0–0.01)
NRBC BLD-RTO: 0 PER 100 WBC
PLATELET # BLD AUTO: 269 K/UL (ref 150–400)
PMV BLD AUTO: 10.9 FL (ref 8.9–12.9)
RBC # BLD AUTO: 3.5 M/UL (ref 4.1–5.7)
WBC # BLD AUTO: 4.4 K/UL (ref 4.1–11.1)

## 2025-05-30 PROCEDURE — 3078F DIAST BP <80 MM HG: CPT | Performed by: STUDENT IN AN ORGANIZED HEALTH CARE EDUCATION/TRAINING PROGRAM

## 2025-05-30 PROCEDURE — 99214 OFFICE O/P EST MOD 30 MIN: CPT | Performed by: STUDENT IN AN ORGANIZED HEALTH CARE EDUCATION/TRAINING PROGRAM

## 2025-05-30 PROCEDURE — 3074F SYST BP LT 130 MM HG: CPT | Performed by: STUDENT IN AN ORGANIZED HEALTH CARE EDUCATION/TRAINING PROGRAM

## 2025-05-30 NOTE — PROGRESS NOTES
Chief Complaint   Patient presents with    FMLA     \"Have you been to the ER, urgent care clinic since your last visit?  Hospitalized since your last visit?\"    NO    “Have you seen or consulted any other health care providers outside of [facility] since your last visit?”    NO                 5/6/2025     9:27 AM   PHQ-9    Little interest or pleasure in doing things 0   Feeling down, depressed, or hopeless 0   PHQ-2 Score 0   PHQ-9 Total Score 0           Financial Resource Strain: Low Risk  (4/16/2024)    Overall Financial Resource Strain (CARDIA)     Difficulty of Paying Living Expenses: Not hard at all      Food Insecurity: No Food Insecurity (5/2/2025)    Hunger Vital Sign     Worried About Running Out of Food in the Last Year: Never true     Ran Out of Food in the Last Year: Never true          Health Maintenance Due   Topic Date Due    Hepatitis B vaccine (1 of 3 - 19+ 3-dose series) Never done    DTaP/Tdap/Td vaccine (1 - Tdap) Never done    Pneumococcal 50+ years Vaccine (1 of 2 - PCV) Never done    Shingles vaccine (1 of 2) Never done    COVID-19 Vaccine (1 - 2024-25 season) Never done

## 2025-05-30 NOTE — PROGRESS NOTES
Jaquan Cartwright (:  1965) is a 59 y.o. male, Established patient, here for evaluation of the following chief complaint(s):  [facility]         Assessment & Plan  1. Primary osteoarthritis of knees: Chronic, unstable. X-ray conducted on 2024 confirmed bilateral knee arthritis, left worse than right. Frequency of flare-ups is approximately once a month, lasting about a day. Capable of performing all job duties, but workday duration contingent on knee pain.  - Complete [facility] paperwork for reduced work schedule if necessary, with a maximum of 8 hours per day during flare-ups.  - Permit time off for recovery during chronic knee pain or arthritis flare-ups.    2. Iron deficiency anemia: Hemoglobin 9.5 on 2025, previously 12.4 a year ago. Ferritin level 7, significantly below normal range of .  - Continue B12 and folate supplements.  - Continue iron supplement every other day.  - Conduct CBC test today to monitor progress.  - Consider iron infusions if no improvement.    3. Heartburn: Pantoprazole has been helping significantly.  - Continue pantoprazole as needed.    Follow-up  - Follow up in 3 months.    Results  Labs   - Hemoglobin: 2025, 9.5   - Hemoglobin: 12.4   - Ferritin: 7    Imaging   - X-ray of bilateral knees: 2024, Confirmed bilateral knee arthritis, left worse than right  1. Primary osteoarthritis of both knees  2. Iron deficiency anemia, unspecified iron deficiency anemia type  -     CBC; Future  3. Gastroesophageal reflux disease, unspecified whether esophagitis present    Return in about 3 months (around 2025) for Chronic conditions follow up.       Subjective   History of Present Illness  The patient presents for a discussion regarding [facility] paperwork.    Bilateral Knee Arthritis and Chronic Pain  - Experiencing bilateral knee arthritis and chronic pain, progressively worsened over the past 3 to 4 years  - Symptoms have become more noticeable since   - Symptoms

## 2025-06-02 ENCOUNTER — RESULTS FOLLOW-UP (OUTPATIENT)
Age: 60
End: 2025-06-02

## 2025-06-04 ENCOUNTER — OFFICE VISIT (OUTPATIENT)
Age: 60
End: 2025-06-04
Payer: COMMERCIAL

## 2025-06-04 VITALS
HEART RATE: 72 BPM | HEIGHT: 67 IN | BODY MASS INDEX: 37.83 KG/M2 | SYSTOLIC BLOOD PRESSURE: 155 MMHG | DIASTOLIC BLOOD PRESSURE: 91 MMHG | OXYGEN SATURATION: 98 % | WEIGHT: 241 LBS

## 2025-06-04 DIAGNOSIS — N43.3 HYDROCELE OF TESTIS: Primary | ICD-10-CM

## 2025-06-04 PROCEDURE — 99244 OFF/OP CNSLTJ NEW/EST MOD 40: CPT | Performed by: UROLOGY

## 2025-06-04 PROCEDURE — 3080F DIAST BP >= 90 MM HG: CPT | Performed by: UROLOGY

## 2025-06-04 PROCEDURE — 3077F SYST BP >= 140 MM HG: CPT | Performed by: UROLOGY

## 2025-06-04 NOTE — PROGRESS NOTES
Chief Complaint   Patient presents with    New Patient     Right side hydrocele     1. Have you been to the ER, urgent care clinic since your last visit?  Hospitalized since your last visit?No    2. Have you seen or consulted any other health care providers outside of the [facility] since your last visit?  Include any pap smears or colon screening. No  BP (!) 155/91   Pulse 72   Ht 1.702 m (5' 7\")   Wt 109.3 kg (241 lb)   SpO2 98%   BMI 37.75 kg/m²

## 2025-06-04 NOTE — ASSESSMENT & PLAN NOTE
He has a very large tense right hydrocele ~ 300-500cc on estimation.  We discussed a hydrocelectomy and scrotoplasty to manage.  The patient was counseled on the risks, benefits and expected course of surgery. Surgery has risks of bleeding, infection, injury, pain, death or other consequences. Perioperative medications and antibiotic use were discussed including the potential for reactions and side effects. Some specific risks of surgery were discussed as well.  He wishes to proceed electively.

## 2025-06-13 ENCOUNTER — TELEPHONE (OUTPATIENT)
Age: 60
End: 2025-06-13

## 2025-06-13 NOTE — TELEPHONE ENCOUNTER
I contacted this patient, his name &  were verified.  I asked him to upload his document to [employer], or fax it to us ([phone]) for Dr. Ovalles to fill it out as he had requested. Patient consented.

## 2025-06-16 ENCOUNTER — TELEPHONE (OUTPATIENT)
Age: 60
End: 2025-06-16

## 2025-06-16 NOTE — TELEPHONE ENCOUNTER
Pt came to the office and request a call back from Nurse gordon regarding his paper he sent over to our office today 06.16.25 .      Best call back number [phone].     06.16.25

## 2025-06-19 NOTE — TELEPHONE ENCOUNTER
I contacted this patient, his name &  were verified.  I informed him that his form has been faxed to the number he provided, and he can  a copy of it. Patient was thankful, said he will pick it up at his next appointment on 2025.

## 2025-06-20 ENCOUNTER — OFFICE VISIT (OUTPATIENT)
Age: 60
End: 2025-06-20

## 2025-06-20 VITALS
BODY MASS INDEX: 37.86 KG/M2 | RESPIRATION RATE: 16 BRPM | OXYGEN SATURATION: 98 % | HEART RATE: 71 BPM | WEIGHT: 241.2 LBS | TEMPERATURE: 97.5 F | SYSTOLIC BLOOD PRESSURE: 113 MMHG | HEIGHT: 67 IN | DIASTOLIC BLOOD PRESSURE: 75 MMHG

## 2025-06-20 DIAGNOSIS — E66.812 CLASS 2 SEVERE OBESITY DUE TO EXCESS CALORIES WITH SERIOUS COMORBIDITY AND BODY MASS INDEX (BMI) OF 37.0 TO 37.9 IN ADULT (HCC): ICD-10-CM

## 2025-06-20 DIAGNOSIS — E66.01 CLASS 2 SEVERE OBESITY DUE TO EXCESS CALORIES WITH SERIOUS COMORBIDITY AND BODY MASS INDEX (BMI) OF 37.0 TO 37.9 IN ADULT (HCC): ICD-10-CM

## 2025-06-20 DIAGNOSIS — I10 PRIMARY HYPERTENSION: ICD-10-CM

## 2025-06-20 DIAGNOSIS — F17.210 TOBACCO DEPENDENCE DUE TO CIGARETTES: ICD-10-CM

## 2025-06-20 DIAGNOSIS — M17.12 PRIMARY OSTEOARTHRITIS OF LEFT KNEE: Primary | ICD-10-CM

## 2025-06-20 RX ORDER — TRIAMCINOLONE ACETONIDE 40 MG/ML
40 INJECTION, SUSPENSION INTRA-ARTICULAR; INTRAMUSCULAR ONCE
Status: COMPLETED | OUTPATIENT
Start: 2025-06-20 | End: 2025-06-20

## 2025-06-20 RX ORDER — LIDOCAINE HYDROCHLORIDE 10 MG/ML
5 INJECTION, SOLUTION EPIDURAL; INFILTRATION; INTRACAUDAL; PERINEURAL ONCE
Status: COMPLETED | OUTPATIENT
Start: 2025-06-20 | End: 2025-06-20

## 2025-06-20 RX ADMIN — LIDOCAINE HYDROCHLORIDE 5 ML: 10 INJECTION, SOLUTION EPIDURAL; INFILTRATION; INTRACAUDAL; PERINEURAL at 11:21

## 2025-06-20 RX ADMIN — TRIAMCINOLONE ACETONIDE 40 MG: 40 INJECTION, SUSPENSION INTRA-ARTICULAR; INTRAMUSCULAR at 11:19

## 2025-06-20 ASSESSMENT — PATIENT HEALTH QUESTIONNAIRE - PHQ9
SUM OF ALL RESPONSES TO PHQ QUESTIONS 1-9: 0
2. FEELING DOWN, DEPRESSED OR HOPELESS: NOT AT ALL
SUM OF ALL RESPONSES TO PHQ QUESTIONS 1-9: 0
1. LITTLE INTEREST OR PLEASURE IN DOING THINGS: NOT AT ALL
SUM OF ALL RESPONSES TO PHQ QUESTIONS 1-9: 0
SUM OF ALL RESPONSES TO PHQ QUESTIONS 1-9: 0

## 2025-06-20 NOTE — PROGRESS NOTES
Chief Complaint   Patient presents with    Knee Pain     Left knee injection     \"Have you been to the ER, urgent care clinic since your last visit?  Hospitalized since your last visit?\"    NO    “Have you seen or consulted any other health care providers outside of [facility] since your last visit?”    NO          Click Here for Release of Records Request             6/20/2025    10:58 AM   PHQ-9    Little interest or pleasure in doing things 0   Feeling down, depressed, or hopeless 0   PHQ-2 Score 0   PHQ-9 Total Score 0           Financial Resource Strain: Low Risk  (4/16/2024)    Overall Financial Resource Strain (CARDIA)     Difficulty of Paying Living Expenses: Not hard at all      Food Insecurity: No Food Insecurity (5/2/2025)    Hunger Vital Sign     Worried About Running Out of Food in the Last Year: Never true     Ran Out of Food in the Last Year: Never true          Health Maintenance Due   Topic Date Due    Hepatitis B vaccine (1 of 3 - 19+ 3-dose series) Never done    DTaP/Tdap/Td vaccine (1 - Tdap) Never done    Pneumococcal 50+ years Vaccine (1 of 2 - PCV) Never done    Shingles vaccine (1 of 2) Never done    COVID-19 Vaccine (1 - 2024-25 season) Never done

## 2025-06-20 NOTE — PROGRESS NOTES
Jaquan Cartwright (:  1965) is a 59 y.o. male, Established patient, here for evaluation of the following chief complaint(s):  Knee Pain (Left knee injection)         Assessment & Plan  1. Left knee pain: Chronic. Imaging confirmed tricompartmental osteoarthritis in . Condition has worsened over time without significant injuries or surgeries.  - Administered left knee injection with steroids and lidocaine. Lidocaine provides immediate relief; steroids may take up to a week for improvement.  - Discussed risks of bleeding and infection. Alternatives include doing nothing, referral to orthopedics, or physical therapy. If beneficial, consider similar procedure for right knee after 3 to 4 months.    2. Weight management: Chronic, unstable.  - Advised to lose approximately 20 to 25 pounds to alleviate knee pain and improve overall health. Recommendations include minimizing carbohydrate intake and increasing physical activity.  - Prescribed Zepbound, a GLP-1 analog, to aid in weight loss. Start with the lowest dose for 4 weeks, followed by a follow-up to assess progress and adjust dosage if necessary.  - Prescription sent to [employer] for prior authorization. If approved, can request to be sent to a local pharmacy.  - Prefers to minimize medications and hopes weight loss will reduce the need for blood pressure medications.    3. Anemia: Chronic. Hemoglobin 9.6.  - Currently taking iron and folic acid supplements without side effects.  - Informed that iron supplements can sometimes cause constipation.  - Discussed potential treatment with blood transfusions if necessary.    4. Smoking cessation: Chronic.  - Currently smokes about half a pack a day. Has not tried nicotine patches or other cessation aids.  - Prefers to focus on one health issue at a time.  - Discussed benefits of smoking cessation for overall health.  - Advised to consider cessation aids in the future.  Patient was counseled on tobacco

## 2025-06-24 ENCOUNTER — CLINICAL DOCUMENTATION (OUTPATIENT)
Facility: HOSPITAL | Age: 60
End: 2025-06-24

## 2025-06-24 NOTE — PROGRESS NOTES
[facility]  Specialty Pharmacy Update    Date: 06/24/25    Jaquan Cartwright 1965     Medication: Zepbound 2.5 mg/0.5 ml     Prior Authorization: through 1/20/2026    Patient must ENROLL IN [facility] FOR COVERAGE [phone].   Spoke with patient to have him call [facility].  Not sure if we can fill once that is done - may need to be routed to another pharmacy.     Corinne McEwen, [facility] at [address]   [place] [address]  phone: [phone]   fax: [phone]

## 2025-08-05 RX ORDER — TIRZEPATIDE 5 MG/.5ML
5 INJECTION, SOLUTION SUBCUTANEOUS
Qty: 2 ML | Refills: 0 | Status: SHIPPED | OUTPATIENT
Start: 2025-08-05

## 2025-08-29 ENCOUNTER — OFFICE VISIT (OUTPATIENT)
Age: 60
End: 2025-08-29
Payer: COMMERCIAL

## 2025-08-29 VITALS
WEIGHT: 248 LBS | OXYGEN SATURATION: 98 % | HEART RATE: 77 BPM | HEIGHT: 67 IN | SYSTOLIC BLOOD PRESSURE: 150 MMHG | DIASTOLIC BLOOD PRESSURE: 92 MMHG | TEMPERATURE: 97.2 F | BODY MASS INDEX: 38.92 KG/M2 | RESPIRATION RATE: 16 BRPM

## 2025-08-29 DIAGNOSIS — E53.8 B12 DEFICIENCY: ICD-10-CM

## 2025-08-29 DIAGNOSIS — I10 PRIMARY HYPERTENSION: Primary | ICD-10-CM

## 2025-08-29 DIAGNOSIS — F17.210 TOBACCO DEPENDENCE DUE TO CIGARETTES: ICD-10-CM

## 2025-08-29 DIAGNOSIS — M17.0 PRIMARY OSTEOARTHRITIS OF BOTH KNEES: ICD-10-CM

## 2025-08-29 DIAGNOSIS — D50.0 IRON DEFICIENCY ANEMIA DUE TO CHRONIC BLOOD LOSS: ICD-10-CM

## 2025-08-29 DIAGNOSIS — K21.9 GASTROESOPHAGEAL REFLUX DISEASE, UNSPECIFIED WHETHER ESOPHAGITIS PRESENT: ICD-10-CM

## 2025-08-29 DIAGNOSIS — E66.812 CLASS 2 SEVERE OBESITY DUE TO EXCESS CALORIES WITH SERIOUS COMORBIDITY AND BODY MASS INDEX (BMI) OF 37.0 TO 37.9 IN ADULT (HCC): ICD-10-CM

## 2025-08-29 DIAGNOSIS — E53.8 FOLATE DEFICIENCY: ICD-10-CM

## 2025-08-29 DIAGNOSIS — E66.01 CLASS 2 SEVERE OBESITY DUE TO EXCESS CALORIES WITH SERIOUS COMORBIDITY AND BODY MASS INDEX (BMI) OF 37.0 TO 37.9 IN ADULT (HCC): ICD-10-CM

## 2025-08-29 LAB
IRON SATN MFR SERPL: 9 %
IRON SERPL-MCNC: 36 UG/DL (ref 40–157)
TIBC SERPL-MCNC: 390 UG/DL (ref 250–450)
UIBC SERPL-MCNC: 354 UG/DL (ref 112–347)

## 2025-08-29 PROCEDURE — 3077F SYST BP >= 140 MM HG: CPT | Performed by: STUDENT IN AN ORGANIZED HEALTH CARE EDUCATION/TRAINING PROGRAM

## 2025-08-29 PROCEDURE — 99407 BEHAV CHNG SMOKING > 10 MIN: CPT | Performed by: STUDENT IN AN ORGANIZED HEALTH CARE EDUCATION/TRAINING PROGRAM

## 2025-08-29 PROCEDURE — 99214 OFFICE O/P EST MOD 30 MIN: CPT | Performed by: STUDENT IN AN ORGANIZED HEALTH CARE EDUCATION/TRAINING PROGRAM

## 2025-08-29 PROCEDURE — 3080F DIAST BP >= 90 MM HG: CPT | Performed by: STUDENT IN AN ORGANIZED HEALTH CARE EDUCATION/TRAINING PROGRAM

## 2025-08-29 PROCEDURE — 99401 PREV MED CNSL INDIV APPRX 15: CPT | Performed by: STUDENT IN AN ORGANIZED HEALTH CARE EDUCATION/TRAINING PROGRAM

## 2025-08-29 RX ORDER — NICOTINE 21 MG/24HR
1 PATCH, TRANSDERMAL 24 HOURS TRANSDERMAL DAILY
Qty: 42 PATCH | Refills: 0 | Status: SHIPPED | OUTPATIENT
Start: 2025-08-29 | End: 2025-10-10

## 2025-08-29 ASSESSMENT — PATIENT HEALTH QUESTIONNAIRE - PHQ9
SUM OF ALL RESPONSES TO PHQ QUESTIONS 1-9: 0
1. LITTLE INTEREST OR PLEASURE IN DOING THINGS: NOT AT ALL
2. FEELING DOWN, DEPRESSED OR HOPELESS: NOT AT ALL
SUM OF ALL RESPONSES TO PHQ QUESTIONS 1-9: 0

## 2025-08-30 LAB
ERYTHROCYTE [DISTWIDTH] IN BLOOD BY AUTOMATED COUNT: 14.3 % (ref 11.5–14.5)
FERRITIN SERPL-MCNC: 18 NG/ML (ref 30–400)
FOLATE SERPL-MCNC: 32.3 NG/ML (ref 7–140)
HCT VFR BLD AUTO: 37 % (ref 36.6–50.3)
HGB BLD-MCNC: 11.2 G/DL (ref 12.1–17)
MCH RBC QN AUTO: 27.8 PG (ref 26–34)
MCHC RBC AUTO-ENTMCNC: 30.3 G/DL (ref 30–36.5)
MCV RBC AUTO: 91.8 FL (ref 80–99)
NRBC # BLD: 0 K/UL (ref 0–0.01)
NRBC BLD-RTO: 0 PER 100 WBC
PLATELET # BLD AUTO: 214 K/UL (ref 150–400)
PMV BLD AUTO: 10.5 FL (ref 8.9–12.9)
RBC # BLD AUTO: 4.03 M/UL (ref 4.1–5.7)
VIT B12 SERPL-MCNC: 998 PG/ML (ref 232–1245)
WBC # BLD AUTO: 4.4 K/UL (ref 4.1–11.1)